# Patient Record
Sex: FEMALE | Race: WHITE | NOT HISPANIC OR LATINO | Employment: FULL TIME | URBAN - NONMETROPOLITAN AREA
[De-identification: names, ages, dates, MRNs, and addresses within clinical notes are randomized per-mention and may not be internally consistent; named-entity substitution may affect disease eponyms.]

---

## 2017-08-18 ENCOUNTER — TRANSCRIBE ORDERS (OUTPATIENT)
Dept: MAMMOGRAPHY | Facility: HOSPITAL | Age: 57
End: 2017-08-18

## 2017-08-18 DIAGNOSIS — Z13.9 SCREENING: Primary | ICD-10-CM

## 2017-09-05 ENCOUNTER — HOSPITAL ENCOUNTER (OUTPATIENT)
Dept: MAMMOGRAPHY | Facility: HOSPITAL | Age: 57
Discharge: HOME | End: 2017-09-05
Admitting: PHYSICIAN ASSISTANT

## 2017-09-05 DIAGNOSIS — Z13.9 SCREENING: ICD-10-CM

## 2017-09-05 PROCEDURE — G0202 SCR MAMMO BI INCL CAD: HCPCS

## 2017-09-05 PROCEDURE — 77063 BREAST TOMOSYNTHESIS BI: CPT

## 2018-12-07 ENCOUNTER — TRANSCRIBE ORDERS (OUTPATIENT)
Dept: MAMMOGRAPHY | Facility: HOSPITAL | Age: 58
End: 2018-12-07

## 2018-12-07 DIAGNOSIS — Z12.39 BREAST CANCER SCREENING: Primary | ICD-10-CM

## 2019-01-24 ENCOUNTER — HOSPITAL ENCOUNTER (OUTPATIENT)
Dept: MAMMOGRAPHY | Facility: HOSPITAL | Age: 59
Discharge: HOME | End: 2019-01-24
Admitting: PHYSICIAN ASSISTANT

## 2019-01-24 DIAGNOSIS — Z12.39 BREAST CANCER SCREENING: ICD-10-CM

## 2019-01-24 PROCEDURE — 77063 BREAST TOMOSYNTHESIS BI: CPT

## 2019-01-24 PROCEDURE — 77067 SCR MAMMO BI INCL CAD: CPT

## 2019-11-07 ENCOUNTER — TRANSCRIBE ORDERS (OUTPATIENT)
Dept: GENERAL RADIOLOGY | Facility: HOSPITAL | Age: 59
End: 2019-11-07

## 2019-11-07 ENCOUNTER — HOSPITAL ENCOUNTER (OUTPATIENT)
Dept: GENERAL RADIOLOGY | Facility: HOSPITAL | Age: 59
Discharge: HOME | End: 2019-11-07
Admitting: NURSE PRACTITIONER

## 2019-11-07 DIAGNOSIS — R05.9 COUGH: ICD-10-CM

## 2019-11-07 DIAGNOSIS — R05.9 COUGH: Primary | ICD-10-CM

## 2019-11-07 PROCEDURE — 71046 X-RAY EXAM CHEST 2 VIEWS: CPT

## 2019-12-12 ENCOUNTER — TRANSCRIBE ORDERS (OUTPATIENT)
Dept: GENERAL RADIOLOGY | Facility: HOSPITAL | Age: 59
End: 2019-12-12

## 2019-12-12 ENCOUNTER — HOSPITAL ENCOUNTER (OUTPATIENT)
Dept: GENERAL RADIOLOGY | Facility: HOSPITAL | Age: 59
Discharge: HOME | End: 2019-12-12
Admitting: PHYSICIAN ASSISTANT

## 2019-12-12 DIAGNOSIS — J18.9 PNEUMONIA DUE TO INFECTIOUS ORGANISM, UNSPECIFIED LATERALITY, UNSPECIFIED PART OF LUNG: Primary | ICD-10-CM

## 2019-12-12 PROCEDURE — 71046 X-RAY EXAM CHEST 2 VIEWS: CPT

## 2020-02-11 ENCOUNTER — TRANSCRIBE ORDERS (OUTPATIENT)
Dept: ADMINISTRATIVE | Facility: HOSPITAL | Age: 60
End: 2020-02-11

## 2020-02-11 DIAGNOSIS — Z12.31 BREAST CANCER SCREENING BY MAMMOGRAM: Primary | ICD-10-CM

## 2020-03-25 ENCOUNTER — APPOINTMENT (OUTPATIENT)
Dept: MAMMOGRAPHY | Facility: HOSPITAL | Age: 60
End: 2020-03-25

## 2020-07-23 ENCOUNTER — HOSPITAL ENCOUNTER (OUTPATIENT)
Dept: MAMMOGRAPHY | Facility: HOSPITAL | Age: 60
Discharge: HOME | End: 2020-07-23
Admitting: PHYSICIAN ASSISTANT

## 2020-07-23 DIAGNOSIS — Z12.31 BREAST CANCER SCREENING BY MAMMOGRAM: ICD-10-CM

## 2020-07-23 PROCEDURE — 77067 SCR MAMMO BI INCL CAD: CPT

## 2020-07-23 PROCEDURE — 77063 BREAST TOMOSYNTHESIS BI: CPT

## 2020-07-27 ENCOUNTER — CONSULT (OUTPATIENT)
Dept: CARDIOLOGY | Facility: CLINIC | Age: 60
End: 2020-07-27

## 2020-07-27 VITALS
SYSTOLIC BLOOD PRESSURE: 110 MMHG | WEIGHT: 203 LBS | BODY MASS INDEX: 33.82 KG/M2 | HEIGHT: 65 IN | OXYGEN SATURATION: 95 % | HEART RATE: 71 BPM | DIASTOLIC BLOOD PRESSURE: 70 MMHG

## 2020-07-27 DIAGNOSIS — R00.2 PALPITATIONS: ICD-10-CM

## 2020-07-27 DIAGNOSIS — R94.31 ABNORMAL ECG: ICD-10-CM

## 2020-07-27 DIAGNOSIS — E78.5 DYSLIPIDEMIA: ICD-10-CM

## 2020-07-27 DIAGNOSIS — I10 ESSENTIAL HYPERTENSION: ICD-10-CM

## 2020-07-27 DIAGNOSIS — R07.2 PRECORDIAL PAIN: Primary | ICD-10-CM

## 2020-07-27 PROCEDURE — 93000 ELECTROCARDIOGRAM COMPLETE: CPT | Performed by: INTERNAL MEDICINE

## 2020-07-27 PROCEDURE — 99204 OFFICE O/P NEW MOD 45 MIN: CPT | Performed by: INTERNAL MEDICINE

## 2020-07-27 RX ORDER — CARVEDILOL 3.12 MG/1
3.12 TABLET ORAL DAILY
COMMUNITY
End: 2020-10-12 | Stop reason: DRUGHIGH

## 2020-07-27 RX ORDER — LISINOPRIL 2.5 MG/1
2.5 TABLET ORAL DAILY
COMMUNITY
End: 2020-12-18 | Stop reason: SDUPTHER

## 2020-07-27 NOTE — PROGRESS NOTES
"    Subjective:     Encounter Date:07/27/2020      Patient ID: Marvin Lemus is a 60 y.o. female.    Chief Complaint: Chest pain  HPI  This is a 60-year-old female patient with no prior history of documented heart disease who presents to cardiology clinic for chest discomfort.  The patient describes having a localized midsternal dull aching pain which does not radiate.  Her discomfort began over 1 year ago.  The discomfort lasts approximately 1 to 2 minutes per episode and typically has a 6-7/10 intensity.  The discomfort has no pleuritic or exertional component.  There is no associated nausea vomiting diaphoresis or lightheadedness.  She cannot identify any precipitating or aggravating features.  The discomfort is improved by taking a deep breath.  The patient underwent cardiac catheterization in November at [facility] which demonstrated no \"blockages\" by her recollection.  We have not received a copy of the catheterization report but this has been requested and will be reviewed when available.  The patient had previously undergone outpatient stress testing and echocardiography at the [facility] both of which were reported to her as being normal.  The patient indicates that her primary care provider at the time was insisting that she undergo cardiac catheterization based on her baseline EKG which the computer assisted interpretation algorithm suggested a prior anteroseptal myocardial infarction.  These changes however are entirely compatible with hypertensive cardiac disease as well as left ventricular hypertrophy with a strain pattern consistent with her longstanding hypertension.  She is a non-smoker.  She has no shortness of breath at rest or with activity.  She has no orthopnea PND or lower extremity edema.  The patient reports having intermittent palpitations which she describes as a sense that her heart is \"skipping beats\".  She will occasionally have a " forceful or pounding sensation after the skipped beat.  These tend to occur at random and she cannot quantify the frequency, which is variable.  She indicates the palpitations typically last a few minutes and are generally improved by taking deep breaths.  She has no history of documented arrhythmia.  She does not recall wearing a outpatient cardiac monitor in the last 12 months.  We have requested her records from the [facility] which will be reviewed when available.  The following portions of the patient's history were reviewed and updated as appropriate: allergies, current medications, past family history, past medical history, past social history, past surgical history and problem  Review of Systems   Constitution: Negative for chills, diaphoresis, fever, malaise/fatigue, weight gain and weight loss.   HENT: Negative for ear discharge, hearing loss, hoarse voice and nosebleeds.    Eyes: Negative for discharge, double vision, pain and photophobia.   Cardiovascular: Positive for chest pain and dyspnea on exertion. Negative for claudication, cyanosis, irregular heartbeat, leg swelling, near-syncope, orthopnea, palpitations, paroxysmal nocturnal dyspnea and syncope.   Respiratory: Negative for cough, hemoptysis, shortness of breath, sputum production and wheezing.    Endocrine: Negative for cold intolerance, heat intolerance, polydipsia, polyphagia and polyuria.   Hematologic/Lymphatic: Negative for adenopathy and bleeding problem. Does not bruise/bleed easily.   Skin: Negative for color change, flushing, itching and rash.   Musculoskeletal: Negative for muscle cramps, muscle weakness, myalgias and stiffness.   Gastrointestinal: Negative for abdominal pain, diarrhea, hematemesis, hematochezia, nausea and vomiting.   Genitourinary: Negative for dysuria, frequency and nocturia.   Neurological: Negative for focal weakness, loss of balance, numbness, paresthesias and seizures.  "  Psychiatric/Behavioral: Negative for altered mental status, hallucinations and suicidal ideas.   Allergic/Immunologic: Negative for HIV exposure, hives and persistent infections.           Current Outpatient Medications:   •  carvedilol (COREG) 3.125 MG tablet, Take 3.125 mg by mouth Daily., Disp: , Rfl:   •  lisinopril (PRINIVIL,ZESTRIL) 2.5 MG tablet, Take 2.5 mg by mouth Daily., Disp: , Rfl:     Objective:   Physical Exam   Constitutional: She is oriented to person, place, and time. She appears well-developed and well-nourished. No distress.   HENT:   Head: Normocephalic and atraumatic.   Mouth/Throat: Oropharynx is clear and moist.   Eyes: Pupils are equal, round, and reactive to light. Conjunctivae and EOM are normal. No scleral icterus.   Neck: Normal range of motion. Neck supple. No JVD present. No tracheal deviation present. No thyromegaly present.   Cardiovascular: Normal rate, regular rhythm, S1 normal, S2 normal, normal heart sounds, intact distal pulses and normal pulses. PMI is not displaced. Exam reveals no gallop and no friction rub.   No murmur heard.  Pulmonary/Chest: Effort normal and breath sounds normal. No stridor. No respiratory distress. She has no wheezes. She has no rales.   Abdominal: Soft. Bowel sounds are normal. She exhibits no distension and no mass. There is no tenderness. There is no rebound and no guarding.   Musculoskeletal: Normal range of motion. She exhibits no edema or deformity.   Neurological: She is alert and oriented to person, place, and time. She displays normal reflexes. No cranial nerve deficit. Coordination normal.   Skin: Skin is warm and dry. No rash noted. She is not diaphoretic. No erythema.   Psychiatric: She has a normal mood and affect. Her behavior is normal. Thought content normal.     Blood pressure 110/70, pulse 71, height 165.1 cm (65\"), weight 92.1 kg (203 lb), SpO2 95 %.   Lab Review:     Assessment:       1. Precordial pain  The patient's chest " discomfort has features both typical and atypical for coronary insufficiency.  The patient has never had an ischemic evaluation.  The patient has multiple risk factors for coronary artery disease.  The patient is unable to do treadmill exercise stress testing due to obesity, shortness of breath with activity and poor effort tolerance.  The patient is convinced that the quality of her chest discomfort is different than that which she was experiencing in the fall of last year.  This warrants further investigation.  If her ischemic evaluation is unrevealing, at some point her primary care provider will have to consider the option of pursuing a diagnostic algorithm to evaluate noncardiac causes of chest pain.  - Stress Test With Myocardial Perfusion Two Day  - Adult Transthoracic Echo Complete W/ Cont if Necessary Per Protocol    2. Essential hypertension  Acceptable blood pressure control.  - Adult Transthoracic Echo Complete W/ Cont if Necessary Per Protocol    3. Dyslipidemia  The patient is not currently on statin based cholesterol-lowering therapy.  She indicates that she was previously taking a cholesterol medication.  She reports that her last cholesterol blood work was reported to her to be acceptable.    4. Abnormal ECG  Her twelve-lead electrocardiogram is consistent with left ventricular hypertrophy with a strain pattern consistent with her history of hypertension.  She has no documented evidence of a prior myocardial infarction.  Apparently multiple avenues of cardiovascular testing in the past demonstrated no evidence of prior infarction.    5. Palpitations  Some of the patient's symptoms could be due to underlying arrhythmia and/or ectopy.  She has never worn an outpatient heart monitor.  - Mobile Cardiac Outpatient Telemetry      ECG 12 Lead  Date/Time: 7/27/2020 4:22 PM  Performed by: Micah Rodríguez MD  Authorized by: Micah Rodríguez MD   Previous ECG: no previous ECG available  Rhythm: sinus  rhythm  Rate: normal  QRS axis: normal  Other findings: left ventricular hypertrophy with strain    Clinical impression: abnormal EKG            Plan:     I have recommended a vasodilator nuclear stress test utilizing a 2-day protocol.  I have recommended an echocardiogram.  I recommended an outpatient heart monitor.  No changes in her medication have been made at today's visit.  Further recommendations will be predicated on the results of her outpatient testing.  We have requested records of her cardiac catheterization at [facility] in November of last year as well as cardiac test results from the [facility] prior to that date.  They will be reviewed once available.

## 2020-08-04 ENCOUNTER — APPOINTMENT (OUTPATIENT)
Dept: NUCLEAR MEDICINE | Facility: HOSPITAL | Age: 60
End: 2020-08-04

## 2020-08-05 ENCOUNTER — APPOINTMENT (OUTPATIENT)
Dept: NUCLEAR MEDICINE | Facility: HOSPITAL | Age: 60
End: 2020-08-05

## 2020-08-07 LAB
BH CV ECHO MEAS - % IVS THICK: 60 %
BH CV ECHO MEAS - % LVPW THICK: 69.2 %
BH CV ECHO MEAS - AO MAX PG (FULL): 1.5 MMHG
BH CV ECHO MEAS - AO MAX PG: 7 MMHG
BH CV ECHO MEAS - AO MEAN PG (FULL): 2 MMHG
BH CV ECHO MEAS - AO MEAN PG: 4 MMHG
BH CV ECHO MEAS - AO ROOT AREA (BSA CORRECTED): 0.9
BH CV ECHO MEAS - AO ROOT AREA: 2.5 CM^2
BH CV ECHO MEAS - AO ROOT DIAM: 1.8 CM
BH CV ECHO MEAS - AO V2 MAX: 128.5 CM/SEC
BH CV ECHO MEAS - AO V2 MEAN: 90.6 CM/SEC
BH CV ECHO MEAS - AO V2 VTI: 26 CM
BH CV ECHO MEAS - AVA(I,A): 2.9 CM^2
BH CV ECHO MEAS - AVA(I,D): 2.9 CM^2
BH CV ECHO MEAS - AVA(V,A): 2.9 CM^2
BH CV ECHO MEAS - AVA(V,D): 2.9 CM^2
BH CV ECHO MEAS - BSA(HAYCOCK): 2.1 M^2
BH CV ECHO MEAS - BSA: 2 M^2
BH CV ECHO MEAS - BZI_BMI: 34.1 KILOGRAMS/M^2
BH CV ECHO MEAS - BZI_METRIC_HEIGHT: 165.1 CM
BH CV ECHO MEAS - BZI_METRIC_WEIGHT: 93 KG
BH CV ECHO MEAS - EDV(CUBED): 195.1 ML
BH CV ECHO MEAS - EDV(MOD-SP4): 159 ML
BH CV ECHO MEAS - EDV(TEICH): 166.6 ML
BH CV ECHO MEAS - EF(CUBED): 63.4 %
BH CV ECHO MEAS - EF(MOD-SP4): 52.8 %
BH CV ECHO MEAS - EF(TEICH): 54.1 %
BH CV ECHO MEAS - ESV(CUBED): 71.5 ML
BH CV ECHO MEAS - ESV(MOD-SP4): 75 ML
BH CV ECHO MEAS - ESV(TEICH): 76.4 ML
BH CV ECHO MEAS - FS: 28.4 %
BH CV ECHO MEAS - IVS/LVPW: 1.2
BH CV ECHO MEAS - IVSD: 0.75 CM
BH CV ECHO MEAS - IVSS: 1.2 CM
BH CV ECHO MEAS - LA DIMENSION: 3.9 CM
BH CV ECHO MEAS - LAD MAJOR: 5.6 CM
BH CV ECHO MEAS - LAT PEAK E' VEL: 12.7 CM/SEC
BH CV ECHO MEAS - LATERAL E/E' RATIO: 7.2
BH CV ECHO MEAS - LV DIASTOLIC VOL/BSA (35-75): 79.5 ML/M^2
BH CV ECHO MEAS - LV IVRT: 0.19 SEC
BH CV ECHO MEAS - LV MASS(C)D: 148.8 GRAMS
BH CV ECHO MEAS - LV MASS(C)DI: 74.4 GRAMS/M^2
BH CV ECHO MEAS - LV MASS(C)S: 164.4 GRAMS
BH CV ECHO MEAS - LV MASS(C)SI: 82.2 GRAMS/M^2
BH CV ECHO MEAS - LV MAX PG: 5.5 MMHG
BH CV ECHO MEAS - LV MEAN PG: 2 MMHG
BH CV ECHO MEAS - LV SYSTOLIC VOL/BSA (12-30): 37.5 ML/M^2
BH CV ECHO MEAS - LV V1 MAX: 117 CM/SEC
BH CV ECHO MEAS - LV V1 MEAN: 67.9 CM/SEC
BH CV ECHO MEAS - LV V1 VTI: 24.4 CM
BH CV ECHO MEAS - LVIDD: 5.8 CM
BH CV ECHO MEAS - LVIDS: 4.2 CM
BH CV ECHO MEAS - LVLD AP4: 8.8 CM
BH CV ECHO MEAS - LVLS AP4: 7.7 CM
BH CV ECHO MEAS - LVOT AREA (M): 3.1 CM^2
BH CV ECHO MEAS - LVOT AREA: 3.1 CM^2
BH CV ECHO MEAS - LVOT DIAM: 2 CM
BH CV ECHO MEAS - LVPWD: 0.65 CM
BH CV ECHO MEAS - LVPWS: 1.1 CM
BH CV ECHO MEAS - MED PEAK E' VEL: 9.8 CM/SEC
BH CV ECHO MEAS - MEDIAL E/E' RATIO: 9.3
BH CV ECHO MEAS - MV A MAX VEL: 78.4 CM/SEC
BH CV ECHO MEAS - MV DEC SLOPE: 679.5 CM/SEC^2
BH CV ECHO MEAS - MV DEC TIME: 0.14 SEC
BH CV ECHO MEAS - MV E MAX VEL: 91.4 CM/SEC
BH CV ECHO MEAS - MV E/A: 1.2
BH CV ECHO MEAS - MV MAX PG: 4.7 MMHG
BH CV ECHO MEAS - MV MEAN PG: 2 MMHG
BH CV ECHO MEAS - MV P1/2T MAX VEL: 91.4 CM/SEC
BH CV ECHO MEAS - MV P1/2T: 39.4 MSEC
BH CV ECHO MEAS - MV V2 MAX: 108 CM/SEC
BH CV ECHO MEAS - MV V2 MEAN: 59.4 CM/SEC
BH CV ECHO MEAS - MV V2 VTI: 22.3 CM
BH CV ECHO MEAS - MVA P1/2T LCG: 2.4 CM^2
BH CV ECHO MEAS - MVA(P1/2T): 5.6 CM^2
BH CV ECHO MEAS - MVA(VTI): 3.4 CM^2
BH CV ECHO MEAS - PA MAX PG: 5.8 MMHG
BH CV ECHO MEAS - PA V2 MAX: 120 CM/SEC
BH CV ECHO MEAS - RAP SYSTOLE: 3 MMHG
BH CV ECHO MEAS - RVSP: 25 MMHG
BH CV ECHO MEAS - SI(AO): 33.1 ML/M^2
BH CV ECHO MEAS - SI(CUBED): 61.9 ML/M^2
BH CV ECHO MEAS - SI(LVOT): 38.3 ML/M^2
BH CV ECHO MEAS - SI(MOD-SP4): 42 ML/M^2
BH CV ECHO MEAS - SI(TEICH): 45.1 ML/M^2
BH CV ECHO MEAS - SV(AO): 66.2 ML
BH CV ECHO MEAS - SV(CUBED): 123.6 ML
BH CV ECHO MEAS - SV(LVOT): 76.7 ML
BH CV ECHO MEAS - SV(MOD-SP4): 84 ML
BH CV ECHO MEAS - SV(TEICH): 90.2 ML
BH CV ECHO MEAS - TR MAX PG: 20 MMHG
BH CV ECHO MEAS - TR MAX VEL: 222 CM/SEC
BH CV ECHO MEAS - TV MAX PG: 2 MMHG
BH CV ECHO MEAS - TV V2 MAX: 70.1 CM/SEC
BH CV ECHO MEASUREMENTS AVERAGE E/E' RATIO: 8.12
LEFT ATRIUM VOLUME INDEX: 40.5 ML/M^2
LEFT ATRIUM VOLUME: 81 ML
LV EF 2D ECHO EST: 55 %

## 2020-08-11 ENCOUNTER — HOSPITAL ENCOUNTER (OUTPATIENT)
Dept: NUCLEAR MEDICINE | Facility: HOSPITAL | Age: 60
Discharge: HOME | End: 2020-08-11

## 2020-08-11 PROCEDURE — 25010000002 REGADENOSON 0.4 MG/5ML SOLUTION: Performed by: INTERNAL MEDICINE

## 2020-08-11 PROCEDURE — A9500 TC99M SESTAMIBI: HCPCS | Performed by: INTERNAL MEDICINE

## 2020-08-11 PROCEDURE — 78452 HT MUSCLE IMAGE SPECT MULT: CPT | Performed by: INTERNAL MEDICINE

## 2020-08-11 PROCEDURE — 93018 CV STRESS TEST I&R ONLY: CPT | Performed by: INTERNAL MEDICINE

## 2020-08-11 PROCEDURE — 78452 HT MUSCLE IMAGE SPECT MULT: CPT

## 2020-08-11 PROCEDURE — 93017 CV STRESS TEST TRACING ONLY: CPT

## 2020-08-11 PROCEDURE — 0 TECHNETIUM SESTAMIBI: Performed by: INTERNAL MEDICINE

## 2020-08-11 RX ADMIN — REGADENOSON 0.4 MG: 0.08 INJECTION, SOLUTION INTRAVENOUS at 08:18

## 2020-08-11 RX ADMIN — TECHNETIUM TC 99M SESTAMIBI 1 DOSE: 1 INJECTION INTRAVENOUS at 08:18

## 2020-08-12 ENCOUNTER — HOSPITAL ENCOUNTER (OUTPATIENT)
Dept: NUCLEAR MEDICINE | Facility: HOSPITAL | Age: 60
Discharge: HOME | End: 2020-08-12

## 2020-08-12 PROCEDURE — 0 TECHNETIUM SESTAMIBI: Performed by: INTERNAL MEDICINE

## 2020-08-12 PROCEDURE — A9500 TC99M SESTAMIBI: HCPCS | Performed by: INTERNAL MEDICINE

## 2020-08-12 RX ADMIN — TECHNETIUM TC 99M SESTAMIBI 1 DOSE: 1 INJECTION INTRAVENOUS at 06:09

## 2020-08-13 LAB
BH CV STRESS COMMENTS STAGE 1: ABNORMAL
BH CV STRESS DOSE REGADENOSON STAGE 1: 0.4
BH CV STRESS DURATION MIN STAGE 1: 0
BH CV STRESS DURATION SEC STAGE 1: 10
BH CV STRESS PROTOCOL 1: ABNORMAL
BH CV STRESS RECOVERY BP: ABNORMAL MMHG
BH CV STRESS RECOVERY HR: 90 BPM
BH CV STRESS STAGE 1: 1
LV EF NUC BP: 45 %
MAXIMAL PREDICTED HEART RATE: 161 BPM
PERCENT MAX PREDICTED HR: 63.98 %
STRESS BASELINE BP: ABNORMAL MMHG
STRESS BASELINE HR: 76 BPM
STRESS PERCENT HR: 75 %
STRESS POST PEAK BP: ABNORMAL MMHG
STRESS POST PEAK HR: 103 BPM
STRESS TARGET HR: 137 BPM

## 2020-09-18 ENCOUNTER — TELEPHONE (OUTPATIENT)
Dept: CARDIOLOGY | Facility: CLINIC | Age: 60
End: 2020-09-18

## 2020-10-12 ENCOUNTER — OFFICE VISIT (OUTPATIENT)
Dept: CARDIOLOGY | Facility: CLINIC | Age: 60
End: 2020-10-12

## 2020-10-12 VITALS
OXYGEN SATURATION: 96 % | SYSTOLIC BLOOD PRESSURE: 130 MMHG | WEIGHT: 200 LBS | BODY MASS INDEX: 33.32 KG/M2 | HEART RATE: 84 BPM | HEIGHT: 65 IN | DIASTOLIC BLOOD PRESSURE: 80 MMHG

## 2020-10-12 DIAGNOSIS — R07.2 PRECORDIAL PAIN: Primary | ICD-10-CM

## 2020-10-12 DIAGNOSIS — I49.3 PVC (PREMATURE VENTRICULAR CONTRACTION): ICD-10-CM

## 2020-10-12 DIAGNOSIS — E78.5 DYSLIPIDEMIA: ICD-10-CM

## 2020-10-12 DIAGNOSIS — I10 ESSENTIAL HYPERTENSION: ICD-10-CM

## 2020-10-12 DIAGNOSIS — R00.2 PALPITATIONS: ICD-10-CM

## 2020-10-12 PROCEDURE — 99214 OFFICE O/P EST MOD 30 MIN: CPT | Performed by: INTERNAL MEDICINE

## 2020-10-12 RX ORDER — FLECAINIDE ACETATE 50 MG/1
50 TABLET ORAL 2 TIMES DAILY
Qty: 60 TABLET | Refills: 11 | Status: SHIPPED | OUTPATIENT
Start: 2020-10-12 | End: 2020-11-11

## 2020-10-12 RX ORDER — CARVEDILOL 3.12 MG/1
3.12 TABLET ORAL 2 TIMES DAILY
Qty: 60 TABLET | Refills: 11 | Status: SHIPPED | OUTPATIENT
Start: 2020-10-12 | End: 2021-02-08

## 2020-10-12 NOTE — PROGRESS NOTES
Subjective:     Encounter Date:10/12/2020      Patient ID: Marvin Lemus is a 60 y.o. female.    Chief Complaint: Palpitations  HPI  This is a 60-year-old female patient who underwent a battery of outpatient testing to evaluate chest discomfort, shortness of breath and palpitations.  The patient had an echocardiogram which was normal. The echocardiogram showed no evidence of ventricular hypertrophy or cardiac chamber enlargement.  Left ventricular systolic and diastolic function was normal.  There was no evidence of valvular pathology of significance, pericardial disease, pulmonary hypertension or intracardiac shunting.  The left ventricular ejection fraction was normal and there were no regional wall motion abnormalities.  The patient underwent a vasodilator nuclear stress test which showed no evidence of ischemia or infarction.  The patient wore a 30-day cardiac monitor demonstrating near 100% concordance between her symptoms (including palpitations, dizziness, chest discomfort and shortness of breath) with the presence of monomorphic PVCs.  The PVC burden was 19%.  The patient symptoms remain unchanged in quality, frequency duration and intensity.  She remains a non-smoker.  She has tried cutting back on caffeine intake.  She has began to exercise and feels somewhat better.    The following portions of the patient's history were reviewed and updated as appropriate: allergies, current medications, past family history, past medical history, past social history, past surgical history and problem  Review of Systems   Constitution: Negative for chills, diaphoresis, fever, malaise/fatigue, weight gain and weight loss.   HENT: Negative for ear discharge, hearing loss, hoarse voice and nosebleeds.    Eyes: Negative for discharge, double vision, pain and photophobia.   Cardiovascular: Positive for chest pain and palpitations. Negative for claudication, cyanosis, dyspnea on exertion, leg swelling, near-syncope,  orthopnea, paroxysmal nocturnal dyspnea and syncope.   Respiratory: Positive for shortness of breath. Negative for cough, hemoptysis, sputum production and wheezing.    Endocrine: Negative for cold intolerance, heat intolerance, polydipsia, polyphagia and polyuria.   Hematologic/Lymphatic: Negative for adenopathy and bleeding problem. Does not bruise/bleed easily.   Skin: Negative for color change, flushing, itching and rash.   Musculoskeletal: Negative for muscle cramps, muscle weakness, myalgias and stiffness.   Gastrointestinal: Negative for abdominal pain, diarrhea, hematemesis, hematochezia, nausea and vomiting.   Genitourinary: Negative for dysuria, frequency and nocturia.   Neurological: Negative for focal weakness, loss of balance, numbness, paresthesias and seizures.   Psychiatric/Behavioral: Negative for altered mental status, hallucinations and suicidal ideas.   Allergic/Immunologic: Negative for HIV exposure, hives and persistent infections.           Current Outpatient Medications:   •  carvedilol (COREG) 3.125 MG tablet, Take 1 tablet by mouth 2 (Two) Times a Day., Disp: 60 tablet, Rfl: 11  •  flecainide (TAMBOCOR) 50 MG tablet, Take 1 tablet by mouth 2 (Two) Times a Day., Disp: 60 tablet, Rfl: 11  •  lisinopril (PRINIVIL,ZESTRIL) 2.5 MG tablet, Take 2.5 mg by mouth Daily., Disp: , Rfl:     Objective:   Vitals signs and nursing note reviewed.   Constitutional:       Appearance: Healthy appearance. Not in distress.   Neck:      Vascular: No JVR. JVD normal.   Pulmonary:      Effort: Pulmonary effort is normal.      Breath sounds: Normal breath sounds. No wheezing. No rhonchi. No rales.   Chest:      Chest wall: Not tender to palpatation.   Cardiovascular:      PMI at left midclavicular line. Normal rate. Regular rhythm. Normal S1. Normal S2.      Murmurs: There is no murmur.      No gallop. No click. No rub.   Pulses:     Intact distal pulses.   Edema:     Peripheral edema absent.   Abdominal:       "General: Bowel sounds are normal.      Palpations: Abdomen is soft.      Tenderness: There is no abdominal tenderness.   Musculoskeletal: Normal range of motion.         General: No tenderness.   Skin:     General: Skin is warm and dry.   Neurological:      General: No focal deficit present.      Mental Status: Alert and oriented to person, place and time.       Blood pressure 130/80, pulse 84, height 165.1 cm (65\"), weight 90.7 kg (200 lb), SpO2 96 %, not currently breastfeeding.   Lab Review:     Assessment:       1. Precordial pain  No evidence of ischemic heart disease.  Her chest discomfort appears to be related to the presence of PVCs.    2. Essential hypertension  Acceptable blood pressure control.    3. Dyslipidemia  This is followed closely by her primary care provider.    4. Palpitations  Her palpitations correlate to the presence of frequent PVCs.    5. PVC (premature ventricular contraction)  19% PVC burden.  Symptomatic.    Procedures    Plan:     The patient has been advised that given the symptomatic nature of her PVCs as well as a overall burden between 10 and 30% that suppression therapy would be an option.  I have discussed with the patient the strategies of pharmacologic PVC suppression versus radiofrequency ablation.  The patient has been given the relative \"pros and cons of each approach.  The patient has been counseled regarding the pathophysiology of frequent PVCs (generally greater than 30% PVC burden) as it relates to the potential development of PVC induced cardiomyopathy-a transient reversible form of left ventricular systolic dysfunction.  At this point the patient is reluctant to adopt either treatment approach.  She indicates that she would like to persist for at least another 4-6 weeks with exercise, weight reduction and complete elimination of caffeine.  I have told her that this is a reasonable approach and we will anticipate repeating her cardiac monitor after that timeframe to " "reassess her overall PVC burden.  This should only require a 48-72-hour cardiac monitor.  I have recommended flecainide 50 mg orally twice per day with up titration as necessary as the first-line antiarrhythmic drug therapy.  The patient indicates she will consider drug therapy after a 4-6-week \"trial\"of intensification of lifestyle modification.      "

## 2020-11-11 ENCOUNTER — OFFICE VISIT (OUTPATIENT)
Dept: CARDIOLOGY | Facility: CLINIC | Age: 60
End: 2020-11-11

## 2020-11-11 VITALS
HEIGHT: 65 IN | OXYGEN SATURATION: 95 % | WEIGHT: 199 LBS | HEART RATE: 71 BPM | DIASTOLIC BLOOD PRESSURE: 74 MMHG | BODY MASS INDEX: 33.15 KG/M2 | SYSTOLIC BLOOD PRESSURE: 118 MMHG

## 2020-11-11 DIAGNOSIS — E78.5 DYSLIPIDEMIA: ICD-10-CM

## 2020-11-11 DIAGNOSIS — R00.2 PALPITATIONS: ICD-10-CM

## 2020-11-11 DIAGNOSIS — I10 ESSENTIAL HYPERTENSION: Primary | ICD-10-CM

## 2020-11-11 PROCEDURE — 99214 OFFICE O/P EST MOD 30 MIN: CPT | Performed by: INTERNAL MEDICINE

## 2020-11-11 NOTE — PROGRESS NOTES
Subjective:     Encounter Date:11/11/2020      Patient ID: Marvin Lemus is a 60 y.o. female.    Chief Complaint: Palpitations  HPI  This is a 60-year-old female patient with symptomatic PVCs.  The patient was recommended to undergo antiarrhythmic drug suppression therapy with flecainide.  However, the patient chose to adopt lifestyle changes and lieu of medical therapy.  The patient indicates that she has been exercising 4-5 days/week.  She has also eliminated caffeine from her diet.  She indicates that her palpitations have now essentially resolved.  She does not wish to take antiarrhythmic drug therapy.  The patient has no chest discomfort at rest or with activity.  There is no exertional chest arm neck jaw shoulder or back discomfort.  There is no orthopnea PND or lower extremity edema.  There is no dizziness or syncope.  She is a non-smoker.  The following portions of the patient's history were reviewed and updated as appropriate: allergies, current medications, past family history, past medical history, past social history, past surgical history and problem  Review of Systems   Constitution: Negative for chills, diaphoresis, fever, malaise/fatigue, weight gain and weight loss.   HENT: Negative for ear discharge, hearing loss, hoarse voice and nosebleeds.    Eyes: Negative for discharge, double vision, pain and photophobia.   Cardiovascular: Negative for chest pain, claudication, cyanosis, dyspnea on exertion, irregular heartbeat, leg swelling, near-syncope, orthopnea, palpitations, paroxysmal nocturnal dyspnea and syncope.   Respiratory: Negative for cough, hemoptysis, shortness of breath, sputum production and wheezing.    Endocrine: Negative for cold intolerance, heat intolerance, polydipsia, polyphagia and polyuria.   Hematologic/Lymphatic: Negative for adenopathy and bleeding problem. Does not bruise/bleed easily.   Skin: Negative for color change, flushing, itching and rash.   Musculoskeletal:  "Negative for muscle cramps, muscle weakness, myalgias and stiffness.   Gastrointestinal: Negative for abdominal pain, diarrhea, hematemesis, hematochezia, nausea and vomiting.   Genitourinary: Negative for dysuria, frequency and nocturia.   Neurological: Negative for focal weakness, loss of balance, numbness, paresthesias and seizures.   Psychiatric/Behavioral: Negative for altered mental status, hallucinations and suicidal ideas.   Allergic/Immunologic: Negative for HIV exposure, hives and persistent infections.           Current Outpatient Medications:   •  carvedilol (COREG) 3.125 MG tablet, Take 1 tablet by mouth 2 (Two) Times a Day., Disp: 60 tablet, Rfl: 11  •  flecainide (TAMBOCOR) 50 MG tablet, Take 1 tablet by mouth 2 (Two) Times a Day., Disp: 60 tablet, Rfl: 11  •  lisinopril (PRINIVIL,ZESTRIL) 2.5 MG tablet, Take 2.5 mg by mouth Daily., Disp: , Rfl:     Objective:   Vitals signs and nursing note reviewed.   Constitutional:       Appearance: Healthy appearance. Not in distress.   Neck:      Vascular: No JVR. JVD normal.   Pulmonary:      Effort: Pulmonary effort is normal.      Breath sounds: Normal breath sounds. No wheezing. No rhonchi. No rales.   Chest:      Chest wall: Not tender to palpatation.   Cardiovascular:      PMI at left midclavicular line. Normal rate. Regular rhythm. Normal S1. Normal S2.      Murmurs: There is no murmur.      No gallop. No click. No rub.   Pulses:     Intact distal pulses.   Edema:     Peripheral edema absent.   Abdominal:      General: Bowel sounds are normal.      Palpations: Abdomen is soft.      Tenderness: There is no abdominal tenderness.   Musculoskeletal: Normal range of motion.         General: No tenderness.   Skin:     General: Skin is warm and dry.   Neurological:      General: No focal deficit present.      Mental Status: Alert and oriented to person, place and time.       Blood pressure 118/74, pulse 71, height 165.1 cm (65\"), weight 90.3 kg (199 lb), SpO2 95 " %, not currently breastfeeding.   Lab Review:     Assessment:       1. Essential hypertension  Acceptable blood pressure control.    2. Dyslipidemia  This is followed closely by her primary care provider.    3. Palpitations  The patient's palpitations have completely resolved.    Procedures    Plan:     The patient is encouraged to maintain her lifestyle changes particularly regular exercise.  She has been congratulated on her discontinuation of caffeinated beverages.  She should avoid all caffeine products going forward.  We had initially considered repeating her Holter monitor to assess for the efficacy of PVC suppression with antiarrhythmic drug therapy.  Now that her palpitations have largely resolved this is unnecessary.  The patient has been advised to contact our office if her palpitations return or worsen.

## 2020-12-18 DIAGNOSIS — I10 ESSENTIAL HYPERTENSION: Primary | ICD-10-CM

## 2020-12-21 RX ORDER — LISINOPRIL 2.5 MG/1
2.5 TABLET ORAL DAILY
Qty: 30 TABLET | Refills: 11 | Status: SHIPPED | OUTPATIENT
Start: 2020-12-21 | End: 2021-02-08

## 2021-02-03 ENCOUNTER — TELEPHONE (OUTPATIENT)
Dept: CARDIOLOGY | Facility: CLINIC | Age: 61
End: 2021-02-03

## 2021-02-03 NOTE — TELEPHONE ENCOUNTER
Patient was seen at [facility]'s Ed and stated that they told her that you were going to double her coreg to 6.25, but rx was written for 12.5. I do not see any any not in regards to this in her chart.  Advise.

## 2021-02-08 ENCOUNTER — OFFICE VISIT (OUTPATIENT)
Dept: CARDIOLOGY | Facility: CLINIC | Age: 61
End: 2021-02-08

## 2021-02-08 VITALS
WEIGHT: 191 LBS | HEART RATE: 69 BPM | OXYGEN SATURATION: 96 % | BODY MASS INDEX: 31.82 KG/M2 | HEIGHT: 65 IN | SYSTOLIC BLOOD PRESSURE: 138 MMHG | DIASTOLIC BLOOD PRESSURE: 90 MMHG

## 2021-02-08 DIAGNOSIS — I49.3 PVC (PREMATURE VENTRICULAR CONTRACTION): ICD-10-CM

## 2021-02-08 DIAGNOSIS — I10 ESSENTIAL HYPERTENSION: ICD-10-CM

## 2021-02-08 DIAGNOSIS — R00.2 PALPITATIONS: ICD-10-CM

## 2021-02-08 DIAGNOSIS — I48.0 PAROXYSMAL ATRIAL FIBRILLATION (HCC): Primary | ICD-10-CM

## 2021-02-08 DIAGNOSIS — E78.5 DYSLIPIDEMIA: ICD-10-CM

## 2021-02-08 DIAGNOSIS — R07.2 PRECORDIAL PAIN: ICD-10-CM

## 2021-02-08 PROCEDURE — 99214 OFFICE O/P EST MOD 30 MIN: CPT | Performed by: INTERNAL MEDICINE

## 2021-02-08 PROCEDURE — 93000 ELECTROCARDIOGRAM COMPLETE: CPT | Performed by: INTERNAL MEDICINE

## 2021-02-08 RX ORDER — FLECAINIDE ACETATE 50 MG/1
50 TABLET ORAL 2 TIMES DAILY
Qty: 60 TABLET | Refills: 11 | Status: SHIPPED | OUTPATIENT
Start: 2021-02-08 | End: 2021-12-07 | Stop reason: SDUPTHER

## 2021-02-08 RX ORDER — METOPROLOL SUCCINATE 50 MG/1
50 TABLET, EXTENDED RELEASE ORAL DAILY
Qty: 30 TABLET | Refills: 11 | Status: SHIPPED | OUTPATIENT
Start: 2021-02-08 | End: 2021-03-12 | Stop reason: SINTOL

## 2021-02-08 RX ORDER — CARVEDILOL 6.25 MG/1
TABLET ORAL
COMMUNITY
Start: 2021-02-02 | End: 2021-02-08

## 2021-02-08 RX ORDER — APIXABAN 2.5 MG/1
2.5 TABLET, FILM COATED ORAL EVERY 12 HOURS SCHEDULED
COMMUNITY
Start: 2021-02-02 | End: 2021-03-19 | Stop reason: DRUGHIGH

## 2021-02-08 NOTE — PROGRESS NOTES
Subjective:     Encounter Date:02/08/2021      Patient ID: Marvin Lemus is a 60 y.o. female.    Chief Complaint: Atrial fibrillation  HPI  This is a 60-year-old female patient who was diagnosed with active COVID-19 infection 2 weeks ago.  She presented to [facility] in [facility] with palpitations and syncope.  At the time she was noted to be in atrial fibrillation with rapid ventricular response.  The patient was given IV diltiazem for rate control measures and started on beta-blockade and Eliquis.  She was instructed to follow-up with us.  The patient indicates she still experiences a sense of tachycardia with heart rates increasing to 140 bpm with posture change.  She has had no further syncopal episodes.  She has had no further change in her baseline symptoms.  The patient underwent a vasodilator nuclear stress test to evaluate atypical chest pain.  This showed no evidence of ischemia or infarction.  The calculated ejection fraction by gated analysis was an error due to frequent PVCs.  An echocardiogram performed showed a normal ejection fraction with no regional wall motion abnormalities.  There was no cardiac chamber enlargement.  There was no evidence of valvular or pericardial disease.  There was no evidence of pulmonary hypertension.  Left ventricular diastolic function and resting mean left atrial pressures were normal at rest.  The patient had previously undergone an outpatient cardiac monitor showing frequent PVCs which correlated to symptoms of palpitations.  The overall PVC burden was 19%.  The following portions of the patient's history were reviewed and updated as appropriate: allergies, current medications, past family history, past medical history, past social history, past surgical history and problem  Review of Systems   Constitution: Negative for chills, diaphoresis, fever, malaise/fatigue, weight gain and weight loss.   HENT: Negative for ear discharge, hearing  loss, hoarse voice and nosebleeds.    Eyes: Negative for discharge, double vision, pain and photophobia.   Cardiovascular: Positive for chest pain, dyspnea on exertion, irregular heartbeat, palpitations and syncope. Negative for claudication, cyanosis, leg swelling, near-syncope, orthopnea and paroxysmal nocturnal dyspnea.   Respiratory: Positive for shortness of breath. Negative for cough, hemoptysis, sputum production and wheezing.    Endocrine: Negative for cold intolerance, heat intolerance, polydipsia, polyphagia and polyuria.   Hematologic/Lymphatic: Negative for adenopathy and bleeding problem. Does not bruise/bleed easily.   Skin: Negative for color change, flushing, itching and rash.   Musculoskeletal: Negative for muscle cramps, muscle weakness, myalgias and stiffness.   Gastrointestinal: Negative for abdominal pain, diarrhea, hematemesis, hematochezia, nausea and vomiting.   Genitourinary: Negative for dysuria, frequency and nocturia.   Neurological: Negative for focal weakness, loss of balance, numbness, paresthesias and seizures.   Psychiatric/Behavioral: Negative for altered mental status, hallucinations and suicidal ideas.   Allergic/Immunologic: Negative for HIV exposure, hives and persistent infections.           Current Outpatient Medications:   •  Eliquis 5 MG tablet tablet, , Disp: , Rfl:   •  flecainide (TAMBOCOR) 50 MG tablet, Take 1 tablet by mouth 2 (Two) Times a Day., Disp: 60 tablet, Rfl: 11  •  metoprolol succinate XL (TOPROL-XL) 50 MG 24 hr tablet, Take 1 tablet by mouth Daily., Disp: 30 tablet, Rfl: 11    Objective:   Vitals signs and nursing note reviewed.   Constitutional:       Appearance: Healthy appearance. Not in distress.   Neck:      Vascular: No JVR. JVD normal.   Pulmonary:      Effort: Pulmonary effort is normal.      Breath sounds: Normal breath sounds. No wheezing. No rhonchi. No rales.   Chest:      Chest wall: Not tender to palpatation.   Cardiovascular:      PMI at left  "midclavicular line. Normal rate. Regular rhythm. Normal S1. Normal S2.      Murmurs: There is no murmur.      No gallop. No click. No rub.   Pulses:     Intact distal pulses.   Edema:     Peripheral edema absent.   Abdominal:      General: Bowel sounds are normal.      Palpations: Abdomen is soft.      Tenderness: There is no abdominal tenderness.   Musculoskeletal: Normal range of motion.         General: No tenderness.   Skin:     General: Skin is warm and dry.   Neurological:      General: No focal deficit present.      Mental Status: Alert and oriented to person, place and time.       Blood pressure 138/90, pulse 69, height 165.1 cm (65\"), weight 86.6 kg (191 lb), SpO2 96 %, not currently breastfeeding.   Lab Review:     Assessment:       1. Essential hypertension  Less than ideal blood pressure control.    2. Dyslipidemia  This is followed by the patient's primary care provider.    3. Palpitations  Most of her symptoms correlate to symptomatic PVCs.    4. PVC (premature ventricular contraction)  Symptomatic.  19% PVC burden.  Suppressive therapy is indicated.    5. Paroxysmal atrial fibrillation (CMS/HCC)  Paroxysmal atrial fibrillation with spontaneous cardioversion normal sinus rhythm.  Symptomatic.  The patient is a candidate for rhythm management strategy.      ECG 12 Lead    Date/Time: 2/8/2021 1:30 PM  Performed by: Micah Rodríguez MD  Authorized by: Micah Rodríguez MD   Comparison: compared with previous ECG   Similar to previous ECG  Rhythm: sinus rhythm  Rate: normal  QRS axis: normal  Other findings: left ventricular hypertrophy with strain    Clinical impression: abnormal EKG            Plan:     I have recommended discontinuation of Coreg and lisinopril.  We will start Toprol-XL at 50 mg orally once in the morning.  She is instructed that if her heart rate continues to \"race\", to increase the dose to 100 mg orally once in the morning by taking two 50 mg tablets.  If necessary we can increase " the Toprol-XL to 200 mg orally once per day.  The patient has been advised to wait at least 7 days to allow steady-state before increasing the dose of Toprol-XL.  She has been cautioned that she will develop fatigue lack of energy and malaise for the first couple weeks on therapy.  She has been encouraged to accept the side effects with the known benefit that optimizing beta-blocker therapy will have dual beneficial effects in controlling heart rate and lowering blood pressure.  She has been encouraged that the beta-blocker side effects will dissipate after 2-3 weeks.  I have also recommended starting flecainide 50 mg orally twice daily.  This can be uptitrated as necessary.  This will have the dual benefit of helping to prevent recurrent paroxysmal atrial fibrillation as well as pharmacologic PVC suppression.  She has been congratulated on her weight loss and encouraged to continue regular daily exercise.  The patient has been reassured that her chest discomfort has no cardiac etiology.  Myocardial perfusion imaging shows no evidence of inducible ischemia.  Her heart is structurally normal with no evidence of valvular heart disease, systolic dysfunction or diastolic dysfunction.  Mild left atrial enlargement is explained by her history of longstanding hypertension.  The patient has been advised that she will need to continue Eliquis therapy for at least 2 to 3 months.  Chads 2 vascular score: 1.  The patient will have a follow-up cardiac monitor in 2 months to assess for the efficacy of PVC suppression as well as screen for recurrence of atrial fibrillation.

## 2021-02-09 ENCOUNTER — TELEPHONE (OUTPATIENT)
Dept: CARDIOLOGY | Facility: CLINIC | Age: 61
End: 2021-02-09

## 2021-02-09 NOTE — TELEPHONE ENCOUNTER
----- Message from Marvin Lemus sent at 2/9/2021  9:07 AM EST -----  Regarding: Prescription Question  Contact: [phone]  Harman Rodríguez,  I really hate to bother you but I am very apprehensive about beginning the Flecainide. When I was in your office in November, you suggested i begin this but we agreed I would lose weight, cut back on caffeine and exercise every day. I am losing weight and plan to lose a lot more. Is it possible to hold off the Flecanide a little longer until we see if this approach works. I promise I am usually very agreeable and am not trying to be difficult but I would  just like to try a more holistic approach first.  If you are agreeable, maybe we can come up with a different medicine routine. I felt good on the morning Lisinopril and evening Carvedilol but i understand if you want to change one or both of these. Thank you for taking the time to read this. It is very important to me that we can communicate.  Marvin Lemus

## 2021-02-18 ENCOUNTER — TELEPHONE (OUTPATIENT)
Dept: CARDIOLOGY | Facility: CLINIC | Age: 61
End: 2021-02-18

## 2021-02-18 NOTE — TELEPHONE ENCOUNTER
----- Message from Marvin Lemus sent at 2/17/2021 12:06 PM EST -----  Regarding: RE: Prescription Question  Contact: [phone]  I have two more questions:    Should I continue to take my low dose aspirin along with the Eliquis?  Also, is it safe for me to get the Covid vaccine since I tested positive for Covid three weeks ago and went into AFib during that time?       Thank you,  Marvin    ----- Message -----  From: DANITZA OAKLEY  Sent: 2/10/21, 12:52 PM  To: Marvin Lemus  Subject: RE: Prescription Question    Ms. Mosley,    You just need to be on a Beta Blocker while taking the Flecainide, Metoprolol is 1 times daily and the Flecainide is twice daily, you can continue the vitamins you listed and You can eat green vegetables with Eliquis, that restriction is for Coumadin /Warfarin.        Reynold      ----- Message -----       From:Marvin Lemus       Sent:2/10/2021 12:26 PM EST         To:Micah Rodríguez MD    Subject:Prescription Question    I started the Flecainide and Metoprolol this morning per your advise. I know you mentioned that I needed to take the Flecainide with a beta blocker. Did you mean each dose should be taken with a beta blocker or I should be on a beta blocker while taking Flecainide?  My prescription is for Flecainide twice a day and Metoprolol just once a day.  Also, may I take my regular vitamins, D3, C, Zinc while on these medications? Lastly, is okay to eat deep green vegetables while on Eliquis? Thank you for your time.   Marvin Lemus

## 2021-02-25 ENCOUNTER — TELEPHONE (OUTPATIENT)
Dept: CARDIOLOGY | Facility: CLINIC | Age: 61
End: 2021-02-25

## 2021-03-01 ENCOUNTER — TELEPHONE (OUTPATIENT)
Dept: CARDIOLOGY | Facility: CLINIC | Age: 61
End: 2021-03-01

## 2021-03-01 NOTE — TELEPHONE ENCOUNTER
----- Message from Marvin Lemus sent at 2/25/2021  7:23 AM EST -----  Regarding: Visit Follow-Up Question  Contact: [phone]  Last night I was reading and my Apple Watch started beeping. It said my heart rate was 37. I tried to do an EKG on my watch to see if I was in AFib but it stated my heart rate was too low to get a reading. Is this concerning to you? I didn't sleep well last night due to worrying, which I know doesn't help.  Could it be medication? Could it be that Apple Watches are not accurate?  Thank you.

## 2021-03-12 ENCOUNTER — CONSULT (OUTPATIENT)
Dept: CARDIOLOGY | Facility: CLINIC | Age: 61
End: 2021-03-12

## 2021-03-12 VITALS
HEART RATE: 56 BPM | OXYGEN SATURATION: 97 % | SYSTOLIC BLOOD PRESSURE: 120 MMHG | HEIGHT: 65 IN | BODY MASS INDEX: 31.82 KG/M2 | DIASTOLIC BLOOD PRESSURE: 74 MMHG | WEIGHT: 191 LBS

## 2021-03-12 DIAGNOSIS — I48.0 PAROXYSMAL ATRIAL FIBRILLATION (HCC): Primary | ICD-10-CM

## 2021-03-12 DIAGNOSIS — I49.3 PVC (PREMATURE VENTRICULAR CONTRACTION): ICD-10-CM

## 2021-03-12 DIAGNOSIS — E78.2 MIXED HYPERLIPIDEMIA: ICD-10-CM

## 2021-03-12 PROCEDURE — 93000 ELECTROCARDIOGRAM COMPLETE: CPT | Performed by: INTERNAL MEDICINE

## 2021-03-12 PROCEDURE — 99204 OFFICE O/P NEW MOD 45 MIN: CPT | Performed by: INTERNAL MEDICINE

## 2021-03-12 RX ORDER — METOPROLOL SUCCINATE 25 MG/1
25 TABLET, EXTENDED RELEASE ORAL DAILY
Qty: 30 TABLET | Refills: 11 | Status: SHIPPED | OUTPATIENT
Start: 2021-03-12 | End: 2021-12-07 | Stop reason: SDUPTHER

## 2021-03-12 NOTE — PROGRESS NOTES
[facility] Cardiology  Consultation H&P  Marvin Lemus  1960  [address] [address]     VISIT DATE:  03/12/21    PCP: Naye Ferrer PA  [address] [facility] [address], [place] [address]    IDENTIFICATION: A 60 y.o. female  in [place]    PROBLEM LIST:  Afib  1/21 post covid(spont conversion)  PVC  7/20 holter 19% burden 52/80/151  8/20 MPS- gating artifact(BHR)-indeterminant  8/20 echo Ef 55% rvsp 25 2019 [facility] SJE Bruce wnl    CC:  Chief Complaint   Patient presents with   • Chest Pain     Consult   • Dizziness   • Irregular Heart Beat       Allergies  Allergies   Allergen Reactions   • Lipitor  [Atorvastatin Calcium] Swelling   • Latex Rash       Current Medications    Current Outpatient Medications:   •  Eliquis 2.5 MG tablet tablet, 2.5 mg Every 12 (Twelve) Hours., Disp: , Rfl:   •  flecainide (TAMBOCOR) 50 MG tablet, Take 1 tablet by mouth 2 (Two) Times a Day., Disp: 60 tablet, Rfl: 11  •  metoprolol succinate XL (TOPROL-XL) 50 MG 24 hr tablet, Take 1 tablet by mouth Daily., Disp: 30 tablet, Rfl: 11     History of Present Illness   HPI  Marvin Lemus is a 60 y.o. year old female with the above mentioned PMH who presents for consult from Self Referring for evaluation of PVCs/ atrial fibrillation.  Review of records and interview patient has undergone evaluation for syncope with new onset atrial fibrillation earlier this year and was noted with Covid positivity.  Spontaneous conversion of A. fib to sinus mechanism.  She was initiated on moderate dose beta-blockade and flecainide and has been less tolerant of bradycardia.  She was asymptomatic with her PVCs historically.  She has had some hair fragility post Covid and notes that occasionally her apple watch will read pulse rate less than 40    Pt denies any chest pain, dyspnea at rest, dyspnea on exertion, orthopnea, PND, palpitations, lower extremity edema, or claudication. Pt denies  history of CHF, DVT, PE, MI, CVA, TIA, or rheumatic fever.       ROS  Review of Systems   Constitutional: Negative for chills, fever, malaise/fatigue, night sweats, weight gain and weight loss.   HENT: Negative for hearing loss and nosebleeds.    Eyes: Negative for blurred vision, vision loss in left eye, vision loss in right eye, visual disturbance and visual halos.   Cardiovascular: Negative for chest pain, claudication, cyanosis, dyspnea on exertion, irregular heartbeat, leg swelling, near-syncope, orthopnea, palpitations, paroxysmal nocturnal dyspnea and syncope.   Respiratory: Negative for cough, hemoptysis, shortness of breath, snoring and wheezing.    Endocrine: Negative for cold intolerance, heat intolerance, polydipsia, polyphagia and polyuria.   Hematologic/Lymphatic: Negative for adenopathy and bleeding problem. Does not bruise/bleed easily.   Skin: Positive for unusual hair distribution. Negative for dry skin, poor wound healing and rash.   Musculoskeletal: Negative for falls, joint pain, joint swelling, muscle cramps, muscle weakness, myalgias and neck pain.   Gastrointestinal: Negative for bloating, abdominal pain, change in bowel habit, bowel incontinence, constipation, diarrhea, dysphagia, excessive appetite, heartburn, hematemesis, hematochezia, jaundice, melena, nausea and vomiting.   Genitourinary: Negative for bladder incontinence, dysuria, flank pain, hematuria, hesitancy and nocturia.   Neurological: Negative for aphonia, excessive daytime sleepiness, dizziness, focal weakness, headaches, light-headedness, loss of balance, seizures, sensory change, tremors, vertigo and weakness.   Psychiatric/Behavioral: Negative for altered mental status, depression, memory loss, substance abuse and suicidal ideas. The patient is not nervous/anxious.    All other systems reviewed and are negative.      SOCIAL HX  Social History     Socioeconomic History   • Marital status: Single     Spouse name: Not on file  "  • Number of children: Not on file   • Years of education: Not on file   • Highest education level: Not on file   Tobacco Use   • Smoking status: Never Smoker   • Smokeless tobacco: Never Used   Vaping Use   • Vaping Use: Never used   Substance and Sexual Activity   • Alcohol use: Yes     Comment: occ   • Drug use: Never   • Sexual activity: Defer       FAMILY HX  Family History   Problem Relation Age of Onset   • Hypertension Mother    • Hypertension Father    • Heart attack Brother        Vitals:    03/12/21 1300   BP: 120/74   BP Location: Left arm   Patient Position: Sitting   Pulse: 56   SpO2: 97%   Weight: 86.6 kg (191 lb)   Height: 165.1 cm (65\")     Body mass index is 31.78 kg/m².     PHYSICAL EXAMINATION:  Constitutional:       Appearance: Healthy appearance. Not in distress.   Neck:      Vascular: No JVR. JVD normal.   Pulmonary:      Effort: Pulmonary effort is normal.      Breath sounds: Normal breath sounds. No wheezing. No rhonchi. No rales.   Chest:      Chest wall: Not tender to palpatation.   Cardiovascular:      PMI at left midclavicular line. Normal rate. Regular rhythm. Normal S1. Normal S2.      Murmurs: There is no murmur.      No gallop. No click. No rub.   Pulses:     Intact distal pulses.   Edema:     Peripheral edema absent.   Abdominal:      General: Bowel sounds are normal.      Palpations: Abdomen is soft.      Tenderness: There is no abdominal tenderness.   Musculoskeletal: Normal range of motion.         General: No tenderness. Skin:     General: Skin is warm and dry.   Neurological:      General: No focal deficit present.      Mental Status: Alert and oriented to person, place and time.         Diagnostic Data:    ECG 12 Lead    Date/Time: 3/12/2021 1:42 PM  Performed by: Obdulio Craven MD  Authorized by: Obdulio Craven MD   Previous ECG: no previous ECG available  Rhythm: sinus bradycardia  BPM: 51    Clinical impression: abnormal EKG          Review of labs in " epic  ASSESSMENT:   Diagnosis Plan   1. Paroxysmal atrial fibrillation (CMS/HCC)     2. PVC (premature ventricular contraction)     3. Mixed hyperlipidemia         PLAN:  Paroxysmal A. fib maintaining sinus mechanism on flecainide  ms.  Questionable symptomatic it beta-blockade will decrease metoprolol to 25 mg.  She may also have a pulse deficit with frequent PVCs and pulse inaccurate on her apple watch    PVC suppressed beta-blockade flecainide normal LVEF no coronary disease    Dyslipidemia with no obstructive coronary disease continue observation        Self Referring, thank you for referring Ms. Lemus for evaluation.  I have forwarded my electronically generated recommendations to you for review.  Please do not hesitate to call with any questions.      Obdulio Craven MD, [facility]

## 2021-03-18 ENCOUNTER — TELEPHONE (OUTPATIENT)
Dept: CARDIOLOGY | Facility: CLINIC | Age: 61
End: 2021-03-18

## 2021-03-18 NOTE — TELEPHONE ENCOUNTER
Pt called unsure of what dose of Eliquis she should be on as she said you mentioned it needed to be changed? Please advise

## 2021-04-16 ENCOUNTER — IMMUNIZATION (OUTPATIENT)
Dept: VACCINE CLINIC | Facility: HOSPITAL | Age: 61
End: 2021-04-16

## 2021-04-16 PROCEDURE — 91300 HC SARSCOV02 VAC 30MCG/0.3ML IM: CPT | Performed by: INTERNAL MEDICINE

## 2021-04-16 PROCEDURE — 0001A: CPT | Performed by: INTERNAL MEDICINE

## 2021-07-30 ENCOUNTER — TRANSCRIBE ORDERS (OUTPATIENT)
Dept: ADMINISTRATIVE | Facility: HOSPITAL | Age: 61
End: 2021-07-30

## 2021-07-30 DIAGNOSIS — Z12.31 VISIT FOR SCREENING MAMMOGRAM: Primary | ICD-10-CM

## 2021-09-16 ENCOUNTER — HOSPITAL ENCOUNTER (OUTPATIENT)
Dept: MAMMOGRAPHY | Facility: HOSPITAL | Age: 61
Discharge: HOME | End: 2021-09-16
Admitting: PHYSICIAN ASSISTANT

## 2021-09-16 DIAGNOSIS — Z12.31 VISIT FOR SCREENING MAMMOGRAM: ICD-10-CM

## 2021-09-16 PROCEDURE — 77063 BREAST TOMOSYNTHESIS BI: CPT

## 2021-09-16 PROCEDURE — 77067 SCR MAMMO BI INCL CAD: CPT

## 2021-10-25 ENCOUNTER — OFFICE VISIT (OUTPATIENT)
Dept: CARDIOLOGY | Facility: CLINIC | Age: 61
End: 2021-10-25

## 2021-10-25 VITALS
DIASTOLIC BLOOD PRESSURE: 84 MMHG | HEART RATE: 60 BPM | SYSTOLIC BLOOD PRESSURE: 122 MMHG | OXYGEN SATURATION: 98 % | WEIGHT: 196.2 LBS | BODY MASS INDEX: 31.53 KG/M2 | HEIGHT: 66 IN

## 2021-10-25 DIAGNOSIS — R00.2 PALPITATIONS: ICD-10-CM

## 2021-10-25 DIAGNOSIS — I48.0 PAROXYSMAL ATRIAL FIBRILLATION (HCC): Primary | ICD-10-CM

## 2021-10-25 PROCEDURE — 99213 OFFICE O/P EST LOW 20 MIN: CPT | Performed by: INTERNAL MEDICINE

## 2021-10-25 PROCEDURE — 93000 ELECTROCARDIOGRAM COMPLETE: CPT | Performed by: INTERNAL MEDICINE

## 2021-10-25 NOTE — PROGRESS NOTES
"[facility] Cardiology  Office Progress Note  Marvin Lemus  1960  [address] [address]       Visit Date: 10/25/21    PCP: Naye Ferrer PA  [address] [facility] [address], [place] [address]    IDENTIFICATION: A 61 y.o. female     PROBLEM LIST:     Afib  1/21 post covid(spont conversion)  PVC  7/20 holter 19% burden 52/80/151  8/20 MPS- gating artifact(BHR)-indeterminant  8/20 echo Ef 55% rvsp 25 2019 [facility] SJE Bruce wnl      CC:   Chief Complaint   Patient presents with   • Hypertension   • Palpitations   • PVC'S       Allergies  Allergies   Allergen Reactions   • Lipitor  [Atorvastatin Calcium] Swelling   • Latex Rash       Current Medications    Current Outpatient Medications:   •  apixaban (ELIQUIS) 5 MG tablet tablet, Take 1 tablet by mouth Every 12 (Twelve) Hours., Disp: 60 tablet, Rfl: 0  •  flecainide (TAMBOCOR) 50 MG tablet, Take 1 tablet by mouth 2 (Two) Times a Day., Disp: 60 tablet, Rfl: 11  •  metoprolol succinate XL (TOPROL-XL) 25 MG 24 hr tablet, Take 1 tablet by mouth Daily., Disp: 30 tablet, Rfl: 11      History of Present Illness   Marvin Lemus is a 61 y.o. year old female here for follow up.    Pt denies any chest pain, dyspnea, dyspnea on exertion, orthopnea, PND, palpitations, lower extremity edema, or claudication.  She did have recurrence of symptoms when she had her Covid vaccines post Covid.  However she had no recurrence of atrial arrhythmias.    OBJECTIVE:  Vitals:    10/25/21 0926   BP: 122/84   BP Location: Right arm   Patient Position: Sitting   Pulse: 60   SpO2: 98%   Weight: 89 kg (196 lb 3.2 oz)   Height: 167.6 cm (66\")     Body mass index is 31.67 kg/m².    Constitutional:       Appearance: Healthy appearance. Not in distress.   Neck:      Vascular: No JVR. JVD normal.   Pulmonary:      Effort: Pulmonary effort is normal.      Breath sounds: Normal breath sounds. No wheezing. No rhonchi. No rales.   Chest:      Chest " wall: Not tender to palpatation.   Cardiovascular:      PMI at left midclavicular line. Normal rate. Regular rhythm. Normal S1. Normal S2.      Murmurs: There is no murmur.      No gallop. No click. No rub.   Pulses:     Intact distal pulses.   Edema:     Peripheral edema absent.   Abdominal:      General: Bowel sounds are normal.      Palpations: Abdomen is soft.      Tenderness: There is no abdominal tenderness.   Musculoskeletal: Normal range of motion.         General: No tenderness. Skin:     General: Skin is warm and dry.   Neurological:      General: No focal deficit present.      Mental Status: Alert and oriented to person, place and time.         Diagnostic Data:    ECG 12 Lead    Date/Time: 10/25/2021 9:44 AM  Performed by: Obdulio Craven MD  Authorized by: Obdulio Craven MD   Previous ECG: no previous ECG available  Rhythm: sinus rhythm  BPM: 60    Clinical impression: non-specific ECG              ASSESSMENT:   Diagnosis Plan   1. Paroxysmal atrial fibrillation (HCC)     2. Palpitations         PLAN:  Paroxysmal atrial fibrillation [id number] she will continue current medical regimen.  1 year post this January would consider discontinuing apixaban as she is maintaining sinus mechanism for 1 year.  She does have an apple watch for which she can follow her rate and rhythm and initiate NOAC if necessary    Palpitations suppressed intolerant to metoprolol flecainide.          Obdulio Craven MD, [facility]

## 2021-12-07 RX ORDER — METOPROLOL SUCCINATE 25 MG/1
25 TABLET, EXTENDED RELEASE ORAL DAILY
Qty: 90 TABLET | Refills: 3 | Status: SHIPPED | OUTPATIENT
Start: 2021-12-07 | End: 2022-12-27

## 2021-12-07 RX ORDER — FLECAINIDE ACETATE 50 MG/1
50 TABLET ORAL 2 TIMES DAILY
Qty: 180 TABLET | Refills: 3 | Status: SHIPPED | OUTPATIENT
Start: 2021-12-07 | End: 2023-03-06

## 2022-06-27 RX ORDER — APIXABAN 5 MG/1
TABLET, FILM COATED ORAL
Qty: 180 TABLET | Refills: 1 | Status: SHIPPED | OUTPATIENT
Start: 2022-06-27 | End: 2022-06-28 | Stop reason: SDUPTHER

## 2022-07-02 ENCOUNTER — HOSPITAL ENCOUNTER (EMERGENCY)
Facility: HOSPITAL | Age: 62
Discharge: HOME | End: 2022-07-02
Attending: EMERGENCY MEDICINE | Admitting: EMERGENCY MEDICINE

## 2022-07-02 VITALS
RESPIRATION RATE: 18 BRPM | SYSTOLIC BLOOD PRESSURE: 128 MMHG | HEART RATE: 77 BPM | OXYGEN SATURATION: 96 % | TEMPERATURE: 98.3 F | WEIGHT: 204 LBS | BODY MASS INDEX: 33.99 KG/M2 | HEIGHT: 65 IN | DIASTOLIC BLOOD PRESSURE: 86 MMHG

## 2022-07-02 DIAGNOSIS — U07.1 COVID-19: Primary | ICD-10-CM

## 2022-07-02 PROCEDURE — 99282 EMERGENCY DEPT VISIT SF MDM: CPT

## 2022-07-02 NOTE — ED PROVIDER NOTES
Subjective   Chief Complaint: COVID-positive requesting prescription  History of Present Illness: 62-year-old female comes in for evaluation of above complaint.  She states she had a positive COVID test today and her symptoms began yesterday with cough congestion.  She states she had COVID about a year ago and developed new onset A. fib and is on flecainide and metoprolol and Eliquis as result.  She has no chest pain or shortness of breath at this time.  She is requesting back Slo-Bid to prevent worsening of symptoms that happened last year when she had COVID.  Signs are all stable within normal limits.  Onset: Yesterday  Timing: Ongoing  Exacerbating / Alleviating factors: None  Associated symptoms: Congestion      Nurses Notes reviewed and agree, including vitals, allergies, social history and prior medical history.          Review of Systems   Constitutional: Negative.  Negative for fever.   HENT: Positive for congestion.    Eyes: Negative.    Respiratory: Positive for cough. Negative for shortness of breath.    Cardiovascular: Negative.  Negative for chest pain.   Gastrointestinal: Negative.    Genitourinary: Negative.    Musculoskeletal: Negative.    Skin: Negative.    Neurological: Negative.    Psychiatric/Behavioral: Negative.        Past Medical History:   Diagnosis Date   • Arrhythmia    • Atrial fibrillation (HCC)    • Hyperlipidemia    • Hypertension        Allergies   Allergen Reactions   • Lipitor  [Atorvastatin Calcium] Swelling   • Latex Rash       Past Surgical History:   Procedure Laterality Date   •  SECTION         Family History   Problem Relation Age of Onset   • Hypertension Mother    • Hypertension Father    • Heart attack Brother    • Breast cancer Paternal Grandmother        Social History     Socioeconomic History   • Marital status: Single   Tobacco Use   • Smoking status: Never Smoker   • Smokeless tobacco: Never Used   Vaping Use   • Vaping Use: Never used   Substance and  Sexual Activity   • Alcohol use: Yes     Comment: occ   • Drug use: Never   • Sexual activity: Defer           Objective   Physical Exam  Vitals and nursing note reviewed.   Constitutional:       General: She is not in acute distress.     Appearance: Normal appearance. She is normal weight. She is not ill-appearing, toxic-appearing or diaphoretic.   HENT:      Head: Normocephalic and atraumatic.      Nose: Nose normal.   Eyes:      Extraocular Movements: Extraocular movements intact.   Cardiovascular:      Rate and Rhythm: Normal rate and regular rhythm.   Pulmonary:      Effort: Pulmonary effort is normal. No respiratory distress.      Breath sounds: Normal breath sounds. No stridor. No wheezing, rhonchi or rales.   Abdominal:      General: Abdomen is flat.   Musculoskeletal:         General: Normal range of motion.      Cervical back: Normal range of motion.   Skin:     General: Skin is warm and dry.   Neurological:      General: No focal deficit present.      Mental Status: She is alert.   Psychiatric:         Mood and Affect: Mood normal.         Behavior: Behavior normal.         Procedures           ED Course      1449  Discussed with pt regarding contraindication with paxlovid and her fleccanide and eliquis.  Discussed rebound symptoms with Monupiravir. She wishes to avoid treatment at this time as her symptoms are very minimal.                                     MDM    Final diagnoses:   COVID-19       ED Disposition  ED Disposition     ED Disposition   Discharge    Condition   Stable    Comment   --             Naye Ferrer PA  [address] [facility]  [address] FLO[address], [address] [address] [phone]      As needed    [facility] Emergency Department  [address] [facility] [address] [phone]    If symptoms worsen         Medication List      No changes were made to your prescriptions during this visit.          Roberto Cornell PA-C  07/02/22 [id number]

## 2022-08-02 NOTE — PROGRESS NOTES
"[facility]  Office Progress Note  Marvin Lemus  1960  [address]  [facility] [address]       Visit Date: 08/03/22    PCP: Naye Ferrer PA  [address]  [facility] [address]    IDENTIFICATION: A 62 y.o. female single     PROBLEM LIST:   1. Afib  1. 1/21 post covid(spont conversion)  2. PVC/Chest pain  1. 7/20 holter 19% burden 52/80/151  2. 8/20 MPS- gating artifact(BHR)-indeterminant  3. 8/20 echo Ef 55% rvsp 25  4. 2019 [facility] SJE Bruce wnl  3. HL  1/21 230/60/73/153  4. 7/22 Covid positive - not hospitalized      CC:   Chief Complaint   Patient presents with   • Paroxysmal atrial fibrillation (HCC)       Allergies  Allergies   Allergen Reactions   • Lipitor  [Atorvastatin Calcium] Swelling   • Latex Rash       Current Medications    Current Outpatient Medications:   •  apixaban (Eliquis) 5 MG tablet tablet, Take 1 tablet by mouth Every 12 (Twelve) Hours., Disp: 180 tablet, Rfl: 1  •  flecainide (TAMBOCOR) 50 MG tablet, Take 1 tablet by mouth 2 (Two) Times a Day., Disp: 180 tablet, Rfl: 3  •  metoprolol succinate XL (TOPROL-XL) 25 MG 24 hr tablet, Take 1 tablet by mouth Daily., Disp: 90 tablet, Rfl: 3      History of Present Illness   Marvin Lemus is a 62 y.o. year old female here for follow up.    Pt denies any chest pain, dyspnea, dyspnea on exertion, orthopnea, PND, palpitations, lower extremity edema, or claudication.  Unfortunately she contracted COVID yet again this time without tachypalpitations or atrial fibrillation.  States that she had sinus issues some fatigue sore throat.    She remains unvaccinated.    She is scheduled to initiate antifungal agent for toenail fungus.      OBJECTIVE:  Vitals:    08/03/22 1123   BP: 118/78   BP Location: Right arm   Patient Position: Sitting   Pulse: 66   SpO2: 97%   Weight: 92.5 kg (204 lb)   Height: 165.1 cm (65\")     Body mass index is 33.95 kg/m².    Constitutional:       " Appearance: Healthy appearance. Not in distress.   Neck:      Vascular: No JVR. JVD normal.   Pulmonary:      Effort: Pulmonary effort is normal.      Breath sounds: Normal breath sounds. No wheezing. No rhonchi. No rales.   Chest:      Chest wall: Not tender to palpatation.   Cardiovascular:      PMI at left midclavicular line. Normal rate. Regular rhythm. Normal S1. Normal S2.      Murmurs: There is no murmur.      No gallop. No click. No rub.   Pulses:     Intact distal pulses.   Edema:     Peripheral edema absent.   Abdominal:      General: Bowel sounds are normal.      Palpations: Abdomen is soft.      Tenderness: There is no abdominal tenderness.   Musculoskeletal: Normal range of motion.         General: No tenderness. Skin:     General: Skin is warm and dry.   Neurological:      General: No focal deficit present.      Mental Status: Alert and oriented to person, place and time.         Diagnostic Data:  Procedures      ASSESSMENT:   Diagnosis Plan   1. Paroxysmal atrial fibrillation (HCC)     2. Palpitations         PLAN:  Paroxysmal A. fib [id number] 2 no recurrence with second round of COVID.  Would consider pill in a pocket apixaban if remains without recurrent event on flecainide.  We will follow-up EKG following 2 days initiation of antifungal with concomitant antiarrhythmic    Dyslipidemia historical normal coronaries would reconsider coronary calcium score within the next 2 years          Obdulio Craven MD, [facility]

## 2022-08-03 ENCOUNTER — OFFICE VISIT (OUTPATIENT)
Dept: CARDIOLOGY | Facility: CLINIC | Age: 62
End: 2022-08-03

## 2022-08-03 VITALS
DIASTOLIC BLOOD PRESSURE: 78 MMHG | WEIGHT: 204 LBS | OXYGEN SATURATION: 97 % | SYSTOLIC BLOOD PRESSURE: 118 MMHG | HEIGHT: 65 IN | HEART RATE: 66 BPM | BODY MASS INDEX: 33.99 KG/M2

## 2022-08-03 DIAGNOSIS — R00.2 PALPITATIONS: ICD-10-CM

## 2022-08-03 DIAGNOSIS — I48.0 PAROXYSMAL ATRIAL FIBRILLATION: Primary | ICD-10-CM

## 2022-08-03 PROCEDURE — 99214 OFFICE O/P EST MOD 30 MIN: CPT | Performed by: INTERNAL MEDICINE

## 2022-08-04 ENCOUNTER — TELEPHONE (OUTPATIENT)
Dept: CARDIOLOGY | Facility: CLINIC | Age: 62
End: 2022-08-04

## 2022-08-04 NOTE — TELEPHONE ENCOUNTER
Pt calling and stating she is holding off on starting her antifungal medication due to it having contraindications w prolonged sunlight. Pt states she is about to go on a 2 week vacation and would like to hold off until she returns (not urgent to start as it is for treatment for fungal toenail.) Encouraged pt to follow up with us for when she begins taking so that we can prepare for her EKG 48 hours post starting medication per [facility] recommendations at LOV.

## 2022-10-18 ENCOUNTER — TRANSCRIBE ORDERS (OUTPATIENT)
Dept: ADMINISTRATIVE | Facility: HOSPITAL | Age: 62
End: 2022-10-18

## 2022-10-18 DIAGNOSIS — Z12.31 ENCOUNTER FOR SCREENING MAMMOGRAM FOR MALIGNANT NEOPLASM OF BREAST: Primary | ICD-10-CM

## 2022-11-11 ENCOUNTER — TRANSCRIBE ORDERS (OUTPATIENT)
Dept: ADMINISTRATIVE | Facility: HOSPITAL | Age: 62
End: 2022-11-11

## 2022-11-11 DIAGNOSIS — Z78.0 POST-MENOPAUSE: Primary | ICD-10-CM

## 2022-11-25 ENCOUNTER — APPOINTMENT (OUTPATIENT)
Dept: BONE DENSITY | Facility: HOSPITAL | Age: 62
End: 2022-11-25

## 2022-11-25 DIAGNOSIS — Z78.0 POST-MENOPAUSE: ICD-10-CM

## 2022-11-25 PROCEDURE — 77080 DXA BONE DENSITY AXIAL: CPT

## 2022-12-27 RX ORDER — METOPROLOL SUCCINATE 25 MG/1
TABLET, EXTENDED RELEASE ORAL
Qty: 90 TABLET | Refills: 3 | Status: SHIPPED | OUTPATIENT
Start: 2022-12-27

## 2023-01-16 ENCOUNTER — HOSPITAL ENCOUNTER (OUTPATIENT)
Dept: MAMMOGRAPHY | Facility: HOSPITAL | Age: 63
Discharge: HOME | End: 2023-01-16
Payer: OTHER GOVERNMENT

## 2023-01-16 DIAGNOSIS — Z12.31 ENCOUNTER FOR SCREENING MAMMOGRAM FOR MALIGNANT NEOPLASM OF BREAST: ICD-10-CM

## 2023-02-16 ENCOUNTER — HOSPITAL ENCOUNTER (OUTPATIENT)
Dept: MAMMOGRAPHY | Facility: HOSPITAL | Age: 63
Discharge: HOME | End: 2023-02-16
Admitting: PHYSICIAN ASSISTANT
Payer: OTHER GOVERNMENT

## 2023-02-16 PROCEDURE — 77063 BREAST TOMOSYNTHESIS BI: CPT | Performed by: RADIOLOGY

## 2023-02-16 PROCEDURE — 77063 BREAST TOMOSYNTHESIS BI: CPT

## 2023-02-16 PROCEDURE — 77067 SCR MAMMO BI INCL CAD: CPT

## 2023-02-16 PROCEDURE — 77067 SCR MAMMO BI INCL CAD: CPT | Performed by: RADIOLOGY

## 2023-03-06 RX ORDER — FLECAINIDE ACETATE 50 MG/1
TABLET ORAL
Qty: 180 TABLET | Refills: 1 | Status: SHIPPED | OUTPATIENT
Start: 2023-03-06

## 2023-04-03 RX ORDER — APIXABAN 5 MG/1
TABLET, FILM COATED ORAL
Qty: 180 TABLET | Refills: 1 | Status: SHIPPED | OUTPATIENT
Start: 2023-04-03

## 2023-09-20 ENCOUNTER — OFFICE VISIT (OUTPATIENT)
Dept: CARDIOLOGY | Facility: CLINIC | Age: 63
End: 2023-09-20
Payer: OTHER GOVERNMENT

## 2023-09-20 VITALS
WEIGHT: 196.4 LBS | HEART RATE: 60 BPM | BODY MASS INDEX: 32.72 KG/M2 | HEIGHT: 65 IN | DIASTOLIC BLOOD PRESSURE: 80 MMHG | SYSTOLIC BLOOD PRESSURE: 120 MMHG | OXYGEN SATURATION: 98 %

## 2023-09-20 DIAGNOSIS — E78.2 MIXED HYPERLIPIDEMIA: ICD-10-CM

## 2023-09-20 DIAGNOSIS — I48.0 PAROXYSMAL ATRIAL FIBRILLATION: Primary | ICD-10-CM

## 2023-09-20 PROCEDURE — 99213 OFFICE O/P EST LOW 20 MIN: CPT | Performed by: INTERNAL MEDICINE

## 2023-09-20 NOTE — PROGRESS NOTES
"[facility] Cardiology  Office Progress Note  Marvin Lemus  1960  [address] [facility] [address]       Visit Date: 09/20/23    PCP: Naye Ferrer PA  [address], [address]  [facility] [address]    IDENTIFICATION: A 63 y.o. female single     PROBLEM LIST:   Afib  1/21 post covid(spont conversion)  PVC/Chest pain  7/20 holter 19% PVC burden 52/80/151  8/20 MPS- gating artifact(BHR)-indeterminant  8/20 echo EF 55% rvsp 25 2019 [facility] SJE Bruce wnl  HL  1/21 230/60/73/153  7/22 Covid positive - not hospitalized      CC:   Chief Complaint   Patient presents with    Paroxysmal atrial fibrillation       Allergies  Allergies   Allergen Reactions    Lipitor  [Atorvastatin Calcium] Swelling    Latex Rash       Current Medications    Current Outpatient Medications:     Eliquis 5 MG tablet tablet, TAKE 1 TABLET EVERY 12 HOURS, Disp: 180 tablet, Rfl: 1    flecainide (TAMBOCOR) 50 MG tablet, TAKE 1 TABLET TWICE A DAY, Disp: 180 tablet, Rfl: 1    metoprolol succinate XL (TOPROL-XL) 25 MG 24 hr tablet, TAKE 1 TABLET DAILY, Disp: 90 tablet, Rfl: 3      History of Present Illness   Marvin Lemus is a 63 y.o. year old female here for follow up.  She was busy as her daughter got  this year and has not been exercising regularly.  She has plans to return to exercise facility within the next week.    She has had some knee pain and is asking about formulations to assist with such    OBJECTIVE:  Vitals:    09/20/23 1425   BP: 120/80   BP Location: Right arm   Patient Position: Sitting   Pulse: 60   SpO2: 98%   Weight: 89.1 kg (196 lb 6.4 oz)   Height: 165.1 cm (65\")     Body mass index is 32.68 kg/m².    Constitutional:       Appearance: Healthy appearance. Not in distress.   Neck:      Vascular: No JVR. JVD normal.   Pulmonary:      Effort: Pulmonary effort is normal.      Breath sounds: Normal breath sounds. No wheezing. No rhonchi. No rales.   Chest:      Chest " wall: Not tender to palpatation.   Cardiovascular:      PMI at left midclavicular line. Normal rate. Regular rhythm. Normal S1. Normal S2.       Murmurs: There is no murmur.      No gallop.  No click. No rub.   Pulses:     Intact distal pulses.   Edema:     Peripheral edema absent.   Abdominal:      General: Bowel sounds are normal.      Palpations: Abdomen is soft.      Tenderness: There is no abdominal tenderness.   Musculoskeletal: Normal range of motion.         General: No tenderness. Skin:     General: Skin is warm and dry.   Neurological:      General: No focal deficit present.      Mental Status: Alert and oriented to person, place and time.       Diagnostic Data:  Procedures      ASSESSMENT:   Diagnosis Plan   1. Paroxysmal atrial fibrillation        2. Mixed hyperlipidemia              PLAN:  Paroxysmal A. fib [id number] 2 discussed potentially obtaining home monitoring device such as smart watch and/or [employer] mobile patient elects to remain on systemic anticoagulation    Dyslipidemia historical normal coronaries would reconsider coronary calcium score within the next 2 years          Obdulio Craven MD, FACC

## 2023-10-09 ENCOUNTER — HOSPITAL ENCOUNTER (OUTPATIENT)
Dept: GENERAL RADIOLOGY | Facility: HOSPITAL | Age: 63
Discharge: HOME | End: 2023-10-09
Admitting: NURSE PRACTITIONER
Payer: OTHER GOVERNMENT

## 2023-10-09 ENCOUNTER — TRANSCRIBE ORDERS (OUTPATIENT)
Dept: GENERAL RADIOLOGY | Facility: HOSPITAL | Age: 63
End: 2023-10-09
Payer: OTHER GOVERNMENT

## 2023-10-09 DIAGNOSIS — M54.50 LOW BACK PAIN, UNSPECIFIED BACK PAIN LATERALITY, UNSPECIFIED CHRONICITY, UNSPECIFIED WHETHER SCIATICA PRESENT: Primary | ICD-10-CM

## 2023-10-09 DIAGNOSIS — M54.50 LOW BACK PAIN, UNSPECIFIED BACK PAIN LATERALITY, UNSPECIFIED CHRONICITY, UNSPECIFIED WHETHER SCIATICA PRESENT: ICD-10-CM

## 2023-10-09 PROCEDURE — 72100 X-RAY EXAM L-S SPINE 2/3 VWS: CPT

## 2023-10-16 RX ORDER — APIXABAN 5 MG/1
TABLET, FILM COATED ORAL
Qty: 180 TABLET | Refills: 3 | Status: SHIPPED | OUTPATIENT
Start: 2023-10-16

## 2024-01-01 RX ORDER — FLECAINIDE ACETATE 50 MG/1
TABLET ORAL
Qty: 180 TABLET | Refills: 0 | Status: SHIPPED | OUTPATIENT
Start: 2024-01-01 | End: 2024-01-03 | Stop reason: SDUPTHER

## 2024-01-03 RX ORDER — FLECAINIDE ACETATE 50 MG/1
50 TABLET ORAL 2 TIMES DAILY
Qty: 180 TABLET | Refills: 0 | Status: SHIPPED | OUTPATIENT
Start: 2024-01-03

## 2024-01-17 RX ORDER — METOPROLOL SUCCINATE 25 MG/1
25 TABLET, EXTENDED RELEASE ORAL DAILY
Qty: 90 TABLET | Refills: 3 | Status: SHIPPED | OUTPATIENT
Start: 2024-01-17

## 2024-01-17 NOTE — TELEPHONE ENCOUNTER
Caller: Marvin Lemus    Relationship: Self    Best call back number: [phone]    Requested Prescriptions:   Requested Prescriptions     Pending Prescriptions Disp Refills    apixaban (Eliquis) 5 MG tablet tablet 180 tablet 3     Sig: Take 1 tablet by mouth Every 12 (Twelve) Hours.    metoprolol succinate XL (TOPROL-XL) 25 MG 24 hr tablet 90 tablet 3     Sig: Take 1 tablet by mouth Daily.        Pharmacy where request should be sent: [facility] [address] [phone] [facility] [phone]      Last office visit with prescribing clinician: 9/20/2023   Last telemedicine visit with prescribing clinician: Visit date not found   Next office visit with prescribing clinician: 12/16/2024     Additional details provided by patient: 1 WEEK REMAINING     Does the patient have less than a 3 day supply:  [] Yes  [x] No    Would you like a call back once the refill request has been completed: [] Yes [x] No    If the office needs to give you a call back, can they leave a voicemail: [] Yes [x] No    Alisha Romano Rep   01/17/24 10:48 EST

## 2024-04-04 RX ORDER — FLECAINIDE ACETATE 50 MG/1
50 TABLET ORAL 2 TIMES DAILY
Qty: 180 TABLET | Refills: 3 | Status: SHIPPED | OUTPATIENT
Start: 2024-04-04

## 2024-05-21 ENCOUNTER — TRANSCRIBE ORDERS (OUTPATIENT)
Dept: ADMINISTRATIVE | Facility: HOSPITAL | Age: 64
End: 2024-05-21
Payer: OTHER GOVERNMENT

## 2024-05-21 DIAGNOSIS — Z12.31 ENCOUNTER FOR SCREENING MAMMOGRAM FOR MALIGNANT NEOPLASM OF BREAST: Primary | ICD-10-CM

## 2024-07-10 ENCOUNTER — HOSPITAL ENCOUNTER (OUTPATIENT)
Dept: MAMMOGRAPHY | Facility: HOSPITAL | Age: 64
Discharge: HOME | End: 2024-07-10
Admitting: NURSE PRACTITIONER
Payer: OTHER GOVERNMENT

## 2024-07-10 DIAGNOSIS — Z12.31 ENCOUNTER FOR SCREENING MAMMOGRAM FOR MALIGNANT NEOPLASM OF BREAST: ICD-10-CM

## 2024-07-10 PROCEDURE — 77067 SCR MAMMO BI INCL CAD: CPT

## 2024-07-10 PROCEDURE — 77063 BREAST TOMOSYNTHESIS BI: CPT

## 2024-07-24 RX ORDER — METOPROLOL SUCCINATE 25 MG/1
25 TABLET, EXTENDED RELEASE ORAL DAILY
Qty: 7 TABLET | Refills: 0 | Status: SHIPPED | OUTPATIENT
Start: 2024-07-24

## 2024-07-27 ENCOUNTER — TELEPHONE (OUTPATIENT)
Dept: URGENT CARE | Facility: CLINIC | Age: 64
End: 2024-07-27
Payer: OTHER GOVERNMENT

## 2024-07-27 DIAGNOSIS — N39.0 ACUTE UTI: Primary | ICD-10-CM

## 2024-07-27 RX ORDER — SULFAMETHOXAZOLE AND TRIMETHOPRIM 800; 160 MG/1; MG/1
TABLET ORAL
Qty: 14 TABLET | Refills: 0 | Status: SHIPPED | OUTPATIENT
Start: 2024-07-27 | End: 2024-07-29

## 2024-07-29 ENCOUNTER — TELEPHONE (OUTPATIENT)
Dept: URGENT CARE | Facility: CLINIC | Age: 64
End: 2024-07-29
Payer: OTHER GOVERNMENT

## 2024-07-29 DIAGNOSIS — N39.0 ACUTE UTI: ICD-10-CM

## 2024-07-29 RX ORDER — SULFAMETHOXAZOLE AND TRIMETHOPRIM 800; 160 MG/1; MG/1
TABLET ORAL
Qty: 14 TABLET | Refills: 0 | Status: SHIPPED | OUTPATIENT
Start: 2024-07-29

## 2024-08-25 PROBLEM — R05.9 COUGH: Status: ACTIVE | Noted: 2024-08-25

## 2024-09-09 ENCOUNTER — TRANSCRIBE ORDERS (OUTPATIENT)
Dept: GENERAL RADIOLOGY | Facility: HOSPITAL | Age: 64
End: 2024-09-09
Payer: OTHER GOVERNMENT

## 2024-09-09 ENCOUNTER — HOSPITAL ENCOUNTER (OUTPATIENT)
Dept: GENERAL RADIOLOGY | Facility: HOSPITAL | Age: 64
Discharge: HOME | End: 2024-09-09
Admitting: NURSE PRACTITIONER
Payer: OTHER GOVERNMENT

## 2024-09-09 DIAGNOSIS — R05.9 COUGH, UNSPECIFIED TYPE: Primary | ICD-10-CM

## 2024-09-09 DIAGNOSIS — R05.9 COUGH, UNSPECIFIED TYPE: ICD-10-CM

## 2024-09-09 PROCEDURE — 71046 X-RAY EXAM CHEST 2 VIEWS: CPT

## 2024-12-16 ENCOUNTER — OFFICE VISIT (OUTPATIENT)
Dept: CARDIOLOGY | Facility: CLINIC | Age: 64
End: 2024-12-16
Payer: OTHER GOVERNMENT

## 2024-12-16 VITALS
HEART RATE: 62 BPM | HEIGHT: 65 IN | WEIGHT: 203 LBS | BODY MASS INDEX: 33.82 KG/M2 | SYSTOLIC BLOOD PRESSURE: 110 MMHG | DIASTOLIC BLOOD PRESSURE: 70 MMHG | OXYGEN SATURATION: 95 %

## 2024-12-16 DIAGNOSIS — I48.0 PAROXYSMAL ATRIAL FIBRILLATION: Primary | ICD-10-CM

## 2024-12-16 DIAGNOSIS — E78.2 MIXED HYPERLIPIDEMIA: ICD-10-CM

## 2024-12-16 PROCEDURE — 93000 ELECTROCARDIOGRAM COMPLETE: CPT | Performed by: INTERNAL MEDICINE

## 2024-12-16 PROCEDURE — 99213 OFFICE O/P EST LOW 20 MIN: CPT | Performed by: INTERNAL MEDICINE

## 2024-12-16 NOTE — PROGRESS NOTES
"[facility]  Office Progress Note  Marvin Lemus  1960  [address] [place] [address]       Visit Date: 12/16/24    PCP: Erum Givens, APRN  [address]  [place] [address]    IDENTIFICATION: A 64 y.o. female single     PROBLEM LIST:   Afib  1/21 post covid(spont conversion)  PVC/Chest pain  7/20 holter 19% PVC burden 52/80/151  8/20 MPS- gating artifact(BHR)-indeterminant  8/20 echo EF 55% rvsp 25 2019 [facility] SJE Bruce wnl  HL  1/21 230/60/73/153  7/22 Covid positive - not hospitalized      CC:   Chief Complaint   Patient presents with    Atrial Fibrillation       Allergies  Allergies   Allergen Reactions    Lipitor  [Atorvastatin Calcium] Swelling    Latex Rash       Current Medications    Current Outpatient Medications:     apixaban (Eliquis) 5 MG tablet tablet, Take 1 tablet by mouth Every 12 (Twelve) Hours., Disp: 180 tablet, Rfl: 3    flecainide (TAMBOCOR) 50 MG tablet, TAKE 1 TABLET TWICE A DAY, Disp: 180 tablet, Rfl: 3    metoprolol succinate XL (TOPROL-XL) 25 MG 24 hr tablet, Take 1 tablet by mouth Daily., Disp: 7 tablet, Rfl: 0      History of Present Illness   Marvin Lemus is a 64 y.o. year old female here for follow up.  She comes dressed in her [employer]  outfit  She is interested in potential GLP agent.  She has had no overt palpitations but has not yet obtained a monitor.  She does walk greater than 10,000 steps daily and notes if she overdoes it she does get winded  OBJECTIVE:  Vitals:    12/16/24 1422   BP: 110/70   BP Location: Right arm   Patient Position: Sitting   Cuff Size: Adult   Pulse: 62   SpO2: 95%   Weight: 92.1 kg (203 lb)   Height: 165.1 cm (65\")       Body mass index is 33.78 kg/m².    Constitutional:       Appearance: Healthy appearance. Not in distress.   Neck:      Vascular: No JVR. JVD normal.   Pulmonary:      Effort: Pulmonary effort is normal.      Breath sounds: Normal breath sounds. No " wheezing. No rhonchi. No rales.   Chest:      Chest wall: Not tender to palpatation.   Cardiovascular:      PMI at left midclavicular line. Normal rate. Regular rhythm. Normal S1. Normal S2.       Murmurs: There is no murmur.      No gallop.  No click. No rub.   Pulses:     Intact distal pulses.   Edema:     Peripheral edema absent.   Abdominal:      General: Bowel sounds are normal.      Palpations: Abdomen is soft.      Tenderness: There is no abdominal tenderness.   Musculoskeletal: Normal range of motion.         General: No tenderness. Skin:     General: Skin is warm and dry.   Neurological:      General: No focal deficit present.      Mental Status: Alert and oriented to person, place and time.         Diagnostic Data:    ECG 12 Lead    Date/Time: 12/16/2024 2:47 PM  Performed by: Obdulio Craven MD    Authorized by: Obdulio Craven MD  Comparison: compared with previous ECG from 9/20/2023  Rhythm: sinus rhythm  BPM: 62  Conduction: non-specific intraventricular conduction delay    Clinical impression: non-specific ECG            ASSESSMENT:   Diagnosis Plan   1. Paroxysmal atrial fibrillation        2. Mixed hyperlipidemia                PLAN:  Paroxysmal A. fib [id number] 2 discussed potentially obtaining home monitoring device such as smart watch and/or [employer] mobile patient elects to remain on systemic anticoagulation    Dyslipidemia historical normal coronaries would reconsider coronary calcium score within the next 2 years          Obdulio Craven MD, [facility]

## 2025-01-23 RX ORDER — METOPROLOL SUCCINATE 25 MG/1
25 TABLET, EXTENDED RELEASE ORAL DAILY
Qty: 90 TABLET | Refills: 3 | Status: SHIPPED | OUTPATIENT
Start: 2025-01-23

## 2025-04-11 ENCOUNTER — HOSPITAL ENCOUNTER (OUTPATIENT)
Dept: GENERAL RADIOLOGY | Facility: HOSPITAL | Age: 65
Discharge: HOME | End: 2025-04-11
Admitting: NURSE PRACTITIONER
Payer: OTHER GOVERNMENT

## 2025-04-11 ENCOUNTER — TRANSCRIBE ORDERS (OUTPATIENT)
Dept: GENERAL RADIOLOGY | Facility: HOSPITAL | Age: 65
End: 2025-04-11
Payer: OTHER GOVERNMENT

## 2025-04-11 DIAGNOSIS — M25.521 RIGHT ELBOW PAIN: ICD-10-CM

## 2025-04-11 DIAGNOSIS — M25.561 ACUTE BILATERAL KNEE PAIN: ICD-10-CM

## 2025-04-11 DIAGNOSIS — M25.562 ACUTE BILATERAL KNEE PAIN: ICD-10-CM

## 2025-04-11 DIAGNOSIS — M25.521 RIGHT ELBOW PAIN: Primary | ICD-10-CM

## 2025-04-11 PROCEDURE — 73562 X-RAY EXAM OF KNEE 3: CPT

## 2025-04-11 PROCEDURE — 73080 X-RAY EXAM OF ELBOW: CPT

## 2025-04-28 RX ORDER — APIXABAN 5 MG/1
5 TABLET, FILM COATED ORAL
Qty: 180 TABLET | Refills: 3 | Status: SHIPPED | OUTPATIENT
Start: 2025-04-28 | End: 2025-04-28 | Stop reason: SDUPTHER

## 2025-05-04 ENCOUNTER — PATIENT ROUNDING (BHMG ONLY) (OUTPATIENT)
Dept: URGENT CARE | Facility: CLINIC | Age: 65
End: 2025-05-04
Payer: OTHER GOVERNMENT

## 2025-07-08 RX ORDER — FLECAINIDE ACETATE 50 MG/1
50 TABLET ORAL 2 TIMES DAILY
Qty: 180 TABLET | Refills: 0 | Status: SHIPPED | OUTPATIENT
Start: 2025-07-08

## 2025-08-12 ENCOUNTER — TRANSCRIBE ORDERS (OUTPATIENT)
Dept: ADMINISTRATIVE | Facility: HOSPITAL | Age: 65
End: 2025-08-12
Payer: OTHER GOVERNMENT

## 2025-08-12 DIAGNOSIS — Z12.31 ENCOUNTER FOR SCREENING MAMMOGRAM FOR MALIGNANT NEOPLASM OF BREAST: Primary | ICD-10-CM

## 2025-08-24 LAB
NCCN CRITERIA FLAG: NORMAL
TYRER CUZICK SCORE: 10.9

## 2025-08-25 ENCOUNTER — HOSPITAL ENCOUNTER (OUTPATIENT)
Dept: MAMMOGRAPHY | Facility: HOSPITAL | Age: 65
Discharge: HOME | End: 2025-08-25
Admitting: NURSE PRACTITIONER
Payer: OTHER GOVERNMENT

## 2025-08-25 DIAGNOSIS — Z12.31 ENCOUNTER FOR SCREENING MAMMOGRAM FOR MALIGNANT NEOPLASM OF BREAST: ICD-10-CM

## 2025-08-25 PROCEDURE — 77067 SCR MAMMO BI INCL CAD: CPT

## 2025-08-25 PROCEDURE — 77063 BREAST TOMOSYNTHESIS BI: CPT
